# Patient Record
Sex: FEMALE | Race: BLACK OR AFRICAN AMERICAN | Employment: OTHER | URBAN - METROPOLITAN AREA
[De-identification: names, ages, dates, MRNs, and addresses within clinical notes are randomized per-mention and may not be internally consistent; named-entity substitution may affect disease eponyms.]

---

## 2017-11-19 ENCOUNTER — HOSPITAL ENCOUNTER (OUTPATIENT)
Age: 77
Setting detail: OBSERVATION
Discharge: NURSING, REHAB OR LONG-TERM CARE | End: 2017-11-22
Attending: EMERGENCY MEDICINE | Admitting: INTERNAL MEDICINE
Payer: MEDICARE

## 2017-11-19 ENCOUNTER — APPOINTMENT (OUTPATIENT)
Dept: CT IMAGING | Age: 77
End: 2017-11-19
Attending: EMERGENCY MEDICINE
Payer: MEDICARE

## 2017-11-19 ENCOUNTER — APPOINTMENT (OUTPATIENT)
Dept: GENERAL RADIOLOGY | Age: 77
End: 2017-11-19
Attending: EMERGENCY MEDICINE
Payer: MEDICARE

## 2017-11-19 DIAGNOSIS — T79.6XXA TRAUMATIC RHABDOMYOLYSIS, INITIAL ENCOUNTER (HCC): ICD-10-CM

## 2017-11-19 DIAGNOSIS — R53.1 WEAKNESS: Primary | ICD-10-CM

## 2017-11-19 DIAGNOSIS — G40.109 PARTIAL SYMPTOMATIC EPILEPSY WITH SIMPLE PARTIAL SEIZURES, NOT INTRACTABLE, WITHOUT STATUS EPILEPTICUS (HCC): ICD-10-CM

## 2017-11-19 DIAGNOSIS — R29.898 WEAKNESS OF BOTH LEGS: ICD-10-CM

## 2017-11-19 PROBLEM — R56.9 SEIZURE (HCC): Status: ACTIVE | Noted: 2017-11-19

## 2017-11-19 LAB
ALBUMIN SERPL-MCNC: 3.9 G/DL (ref 3.5–5)
ALBUMIN/GLOB SERPL: 0.8 {RATIO} (ref 1.1–2.2)
ALP SERPL-CCNC: 83 U/L (ref 45–117)
ALT SERPL-CCNC: 31 U/L (ref 12–78)
ANION GAP SERPL CALC-SCNC: 11 MMOL/L (ref 5–15)
APPEARANCE UR: CLEAR
AST SERPL-CCNC: 57 U/L (ref 15–37)
BACTERIA URNS QL MICRO: NEGATIVE /HPF
BASOPHILS # BLD: 0 K/UL (ref 0–0.1)
BASOPHILS NFR BLD: 0 % (ref 0–1)
BILIRUB SERPL-MCNC: 0.4 MG/DL (ref 0.2–1)
BILIRUB UR QL: NEGATIVE
BUN SERPL-MCNC: 13 MG/DL (ref 6–20)
BUN/CREAT SERPL: 18 (ref 12–20)
CALCIUM SERPL-MCNC: 10 MG/DL (ref 8.5–10.1)
CHLORIDE SERPL-SCNC: 101 MMOL/L (ref 97–108)
CK MB CFR SERPL CALC: 2.1 % (ref 0–2.5)
CK MB SERPL-MCNC: 33.3 NG/ML (ref 5–25)
CK SERPL-CCNC: 1597 U/L (ref 26–192)
CO2 SERPL-SCNC: 24 MMOL/L (ref 21–32)
COLOR UR: ABNORMAL
CREAT SERPL-MCNC: 0.71 MG/DL (ref 0.55–1.02)
EOSINOPHIL # BLD: 0 K/UL (ref 0–0.4)
EOSINOPHIL NFR BLD: 0 % (ref 0–7)
EPITH CASTS URNS QL MICRO: ABNORMAL /LPF
ERYTHROCYTE [DISTWIDTH] IN BLOOD BY AUTOMATED COUNT: 13.1 % (ref 11.5–14.5)
GLOBULIN SER CALC-MCNC: 4.8 G/DL (ref 2–4)
GLUCOSE SERPL-MCNC: 103 MG/DL (ref 65–100)
GLUCOSE UR STRIP.AUTO-MCNC: 100 MG/DL
HCT VFR BLD AUTO: 39.5 % (ref 35–47)
HGB BLD-MCNC: 13.6 G/DL (ref 11.5–16)
HGB UR QL STRIP: ABNORMAL
HYALINE CASTS URNS QL MICRO: ABNORMAL /LPF (ref 0–5)
KETONES UR QL STRIP.AUTO: 15 MG/DL
LEUKOCYTE ESTERASE UR QL STRIP.AUTO: NEGATIVE
LYMPHOCYTES # BLD: 2.4 K/UL (ref 0.8–3.5)
LYMPHOCYTES NFR BLD: 26 % (ref 12–49)
MCH RBC QN AUTO: 33 PG (ref 26–34)
MCHC RBC AUTO-ENTMCNC: 34.4 G/DL (ref 30–36.5)
MCV RBC AUTO: 95.9 FL (ref 80–99)
MONOCYTES # BLD: 1 K/UL (ref 0–1)
MONOCYTES NFR BLD: 11 % (ref 5–13)
NEUTS SEG # BLD: 5.9 K/UL (ref 1.8–8)
NEUTS SEG NFR BLD: 63 % (ref 32–75)
NITRITE UR QL STRIP.AUTO: NEGATIVE
PH UR STRIP: 7 [PH] (ref 5–8)
PLATELET # BLD AUTO: 182 K/UL (ref 150–400)
POTASSIUM SERPL-SCNC: 3.8 MMOL/L (ref 3.5–5.1)
PROT SERPL-MCNC: 8.7 G/DL (ref 6.4–8.2)
PROT UR STRIP-MCNC: NEGATIVE MG/DL
RBC # BLD AUTO: 4.12 M/UL (ref 3.8–5.2)
RBC #/AREA URNS HPF: ABNORMAL /HPF (ref 0–5)
SODIUM SERPL-SCNC: 136 MMOL/L (ref 136–145)
SP GR UR REFRACTOMETRY: 1.01 (ref 1–1.03)
UR CULT HOLD, URHOLD: NORMAL
UROBILINOGEN UR QL STRIP.AUTO: 0.2 EU/DL (ref 0.2–1)
WBC # BLD AUTO: 9.4 K/UL (ref 3.6–11)
WBC URNS QL MICRO: ABNORMAL /HPF (ref 0–4)

## 2017-11-19 PROCEDURE — 82550 ASSAY OF CK (CPK): CPT | Performed by: EMERGENCY MEDICINE

## 2017-11-19 PROCEDURE — 70450 CT HEAD/BRAIN W/O DYE: CPT

## 2017-11-19 PROCEDURE — 73502 X-RAY EXAM HIP UNI 2-3 VIEWS: CPT

## 2017-11-19 PROCEDURE — 65390000012 HC CONDITION CODE 44 OBSERVATION

## 2017-11-19 PROCEDURE — 82553 CREATINE MB FRACTION: CPT | Performed by: EMERGENCY MEDICINE

## 2017-11-19 PROCEDURE — 80053 COMPREHEN METABOLIC PANEL: CPT | Performed by: EMERGENCY MEDICINE

## 2017-11-19 PROCEDURE — 93005 ELECTROCARDIOGRAM TRACING: CPT

## 2017-11-19 PROCEDURE — 74011250636 HC RX REV CODE- 250/636: Performed by: INTERNAL MEDICINE

## 2017-11-19 PROCEDURE — 99218 HC RM OBSERVATION: CPT

## 2017-11-19 PROCEDURE — 73562 X-RAY EXAM OF KNEE 3: CPT

## 2017-11-19 PROCEDURE — 96372 THER/PROPH/DIAG INJ SC/IM: CPT

## 2017-11-19 PROCEDURE — 36415 COLL VENOUS BLD VENIPUNCTURE: CPT | Performed by: EMERGENCY MEDICINE

## 2017-11-19 PROCEDURE — 71010 XR CHEST PORT: CPT

## 2017-11-19 PROCEDURE — 99285 EMERGENCY DEPT VISIT HI MDM: CPT

## 2017-11-19 PROCEDURE — 84484 ASSAY OF TROPONIN QUANT: CPT | Performed by: HOSPITALIST

## 2017-11-19 PROCEDURE — 85025 COMPLETE CBC W/AUTO DIFF WBC: CPT | Performed by: EMERGENCY MEDICINE

## 2017-11-19 PROCEDURE — 81001 URINALYSIS AUTO W/SCOPE: CPT | Performed by: EMERGENCY MEDICINE

## 2017-11-19 PROCEDURE — 80164 ASSAY DIPROPYLACETIC ACD TOT: CPT | Performed by: PSYCHIATRY & NEUROLOGY

## 2017-11-19 PROCEDURE — 36415 COLL VENOUS BLD VENIPUNCTURE: CPT | Performed by: PSYCHIATRY & NEUROLOGY

## 2017-11-19 PROCEDURE — 74011250637 HC RX REV CODE- 250/637: Performed by: INTERNAL MEDICINE

## 2017-11-19 RX ORDER — SIMVASTATIN 10 MG/1
10 TABLET, FILM COATED ORAL
Status: DISCONTINUED | OUTPATIENT
Start: 2017-11-19 | End: 2017-11-22 | Stop reason: HOSPADM

## 2017-11-19 RX ORDER — LEVETIRACETAM 500 MG/1
1000 TABLET ORAL 2 TIMES DAILY
Status: DISCONTINUED | OUTPATIENT
Start: 2017-11-20 | End: 2017-11-22 | Stop reason: HOSPADM

## 2017-11-19 RX ORDER — SODIUM CHLORIDE 0.9 % (FLUSH) 0.9 %
5-10 SYRINGE (ML) INJECTION EVERY 8 HOURS
Status: DISCONTINUED | OUTPATIENT
Start: 2017-11-19 | End: 2017-11-22 | Stop reason: HOSPADM

## 2017-11-19 RX ORDER — ASPIRIN 81 MG/1
81 TABLET ORAL DAILY
Status: DISCONTINUED | OUTPATIENT
Start: 2017-11-20 | End: 2017-11-19

## 2017-11-19 RX ORDER — SODIUM CHLORIDE 0.9 % (FLUSH) 0.9 %
5-10 SYRINGE (ML) INJECTION AS NEEDED
Status: DISCONTINUED | OUTPATIENT
Start: 2017-11-19 | End: 2017-11-22 | Stop reason: HOSPADM

## 2017-11-19 RX ORDER — SODIUM CHLORIDE 9 MG/ML
75 INJECTION, SOLUTION INTRAVENOUS CONTINUOUS
Status: DISCONTINUED | OUTPATIENT
Start: 2017-11-20 | End: 2017-11-21

## 2017-11-19 RX ORDER — LEVETIRACETAM 1000 MG/1
1000 TABLET ORAL 2 TIMES DAILY
COMMUNITY

## 2017-11-19 RX ORDER — FUROSEMIDE 20 MG/1
20 TABLET ORAL DAILY
Status: DISCONTINUED | OUTPATIENT
Start: 2017-11-20 | End: 2017-11-19

## 2017-11-19 RX ORDER — SODIUM CHLORIDE 9 MG/ML
75 INJECTION, SOLUTION INTRAVENOUS CONTINUOUS
Status: DISCONTINUED | OUTPATIENT
Start: 2017-11-19 | End: 2017-11-19

## 2017-11-19 RX ORDER — CALCIUM CARBONATE 500(1250)
1 TABLET ORAL
COMMUNITY
End: 2017-12-13

## 2017-11-19 RX ORDER — SIMVASTATIN 10 MG/1
10 TABLET, FILM COATED ORAL
COMMUNITY

## 2017-11-19 RX ORDER — ENOXAPARIN SODIUM 100 MG/ML
40 INJECTION SUBCUTANEOUS EVERY 24 HOURS
Status: DISCONTINUED | OUTPATIENT
Start: 2017-11-19 | End: 2017-11-22 | Stop reason: HOSPADM

## 2017-11-19 RX ORDER — LORATADINE 10 MG/1
10 TABLET ORAL DAILY
Status: DISCONTINUED | OUTPATIENT
Start: 2017-11-20 | End: 2017-11-19

## 2017-11-19 RX ORDER — DIVALPROEX SODIUM 250 MG/1
500 TABLET, EXTENDED RELEASE ORAL EVERY 12 HOURS
COMMUNITY

## 2017-11-19 RX ORDER — METOPROLOL TARTRATE 25 MG/1
25 TABLET, FILM COATED ORAL 2 TIMES DAILY
Status: DISCONTINUED | OUTPATIENT
Start: 2017-11-19 | End: 2017-11-19

## 2017-11-19 RX ORDER — CARBIDOPA AND LEVODOPA 25; 100 MG/1; MG/1
1 TABLET, ORALLY DISINTEGRATING ORAL 3 TIMES DAILY
Status: DISCONTINUED | OUTPATIENT
Start: 2017-11-20 | End: 2017-11-22 | Stop reason: HOSPADM

## 2017-11-19 RX ORDER — LEVETIRACETAM 500 MG/1
1000 TABLET ORAL 2 TIMES DAILY
Status: DISCONTINUED | OUTPATIENT
Start: 2017-11-19 | End: 2017-11-19

## 2017-11-19 RX ORDER — CARBIDOPA AND LEVODOPA 25; 100 MG/1; MG/1
1 TABLET ORAL DAILY
Status: ON HOLD | COMMUNITY
End: 2017-11-22

## 2017-11-19 RX ORDER — CARBIDOPA AND LEVODOPA 25; 100 MG/1; MG/1
1 TABLET ORAL DAILY
Status: DISCONTINUED | OUTPATIENT
Start: 2017-11-20 | End: 2017-11-19

## 2017-11-19 RX ORDER — METOPROLOL TARTRATE 25 MG/1
25 TABLET, FILM COATED ORAL 2 TIMES DAILY
Status: DISCONTINUED | OUTPATIENT
Start: 2017-11-20 | End: 2017-11-21

## 2017-11-19 RX ORDER — FUROSEMIDE 20 MG/1
20 TABLET ORAL
COMMUNITY
End: 2017-12-13

## 2017-11-19 RX ORDER — SIMVASTATIN 10 MG/1
10 TABLET, FILM COATED ORAL
Status: DISCONTINUED | OUTPATIENT
Start: 2017-11-19 | End: 2017-11-19

## 2017-11-19 RX ORDER — LORATADINE 10 MG/1
10 TABLET ORAL DAILY
Status: DISCONTINUED | OUTPATIENT
Start: 2017-11-20 | End: 2017-11-22 | Stop reason: HOSPADM

## 2017-11-19 RX ORDER — DIVALPROEX SODIUM 500 MG/1
500 TABLET, EXTENDED RELEASE ORAL EVERY 12 HOURS
Status: DISCONTINUED | OUTPATIENT
Start: 2017-11-19 | End: 2017-11-22 | Stop reason: HOSPADM

## 2017-11-19 RX ORDER — ENOXAPARIN SODIUM 100 MG/ML
40 INJECTION SUBCUTANEOUS EVERY 24 HOURS
Status: DISCONTINUED | OUTPATIENT
Start: 2017-11-19 | End: 2017-11-19

## 2017-11-19 RX ORDER — CALCIUM CARBONATE 500(1250)
500 TABLET ORAL
Status: DISCONTINUED | OUTPATIENT
Start: 2017-11-20 | End: 2017-11-22 | Stop reason: HOSPADM

## 2017-11-19 RX ORDER — FUROSEMIDE 40 MG/1
20 TABLET ORAL DAILY
Status: DISCONTINUED | OUTPATIENT
Start: 2017-11-20 | End: 2017-11-19

## 2017-11-19 RX ORDER — DIVALPROEX SODIUM 500 MG/1
500 TABLET, EXTENDED RELEASE ORAL EVERY 12 HOURS
Status: DISCONTINUED | OUTPATIENT
Start: 2017-11-19 | End: 2017-11-19

## 2017-11-19 RX ORDER — CALCIUM CARBONATE 500(1250)
500 TABLET ORAL
Status: DISCONTINUED | OUTPATIENT
Start: 2017-11-20 | End: 2017-11-19

## 2017-11-19 RX ORDER — METOPROLOL TARTRATE 25 MG/1
25 TABLET, FILM COATED ORAL 2 TIMES DAILY
COMMUNITY

## 2017-11-19 RX ADMIN — SODIUM CHLORIDE 500 ML: 900 INJECTION, SOLUTION INTRAVENOUS at 23:07

## 2017-11-19 RX ADMIN — Medication 10 ML: at 21:35

## 2017-11-19 RX ADMIN — DIVALPROEX SODIUM 500 MG: 500 TABLET, FILM COATED, EXTENDED RELEASE ORAL at 23:27

## 2017-11-19 RX ADMIN — SIMVASTATIN 10 MG: 10 TABLET, FILM COATED ORAL at 23:27

## 2017-11-19 RX ADMIN — ENOXAPARIN SODIUM 40 MG: 40 INJECTION SUBCUTANEOUS at 21:34

## 2017-11-19 NOTE — IP AVS SNAPSHOT
Summary of Care Report The Summary of Care report has been created to help improve care coordination. Users with access to [employer] or [address] (Web-based application) may access additional patient information including the Discharge Summary. If you are not currently a [address] user and need more information, please call the number listed below in the [place] [address] section and ask to be connected with Medical Records. Facility Information Name Address Phone [address] [facility] [phone] Patient Information Patient Name Sex  Rebecca Martinez ([id number]) Female 1940 Discharge Information Admitting Provider Service Area Unit  
 Juan Daniel Shook MD / [address] [place] [address] Med Surg / [phone] Discharge Provider Discharge Date/Time Discharge Disposition Destination (none) 2017 (Pending) MANUEL (none) Patient Language Language ENGLISH [13] Hospital Problems as of 2017  Reviewed: 2016  1:18 PM by Anthony Yang MD  
  
  
  
 Class Noted - Resolved Last Modified POA Active Problems Weakness of both legs  2017 - Present 2017 by Camila Olszewski, DO Unknown Entered by Jordon Domingo MD  
  Partial symptomatic epilepsy ([facility] [address]  2017 - Present 2017 by Camila Olszewski, DO Unknown Entered by Camila Olszewski, DO Weakness due to cerebrovascular accident ([address]  2017 - Present 2017 by Cecilia Flores MD Unknown Entered by Cecilia Flores MD  
  
Non-Hospital Problems as of 2017  Reviewed: 2016  1:18 PM by Anthony Yang MD  
  
  
  
 Class Noted - Resolved Last Modified Active Problems   Seizures ([address]  2016 - Present 2016 by Danae No MD  
  Entered by Danae No MD  
  
 You are allergic to the following Allergen Reactions Latex Hives Amoxicillin Hives Penicillins Other (comments) \"It turns my mouth inside out with sores\" Prednisone Other (comments) Sulfa (Sulfonamide Antibiotics) Other (comments) \"It made me real sick to my stomach\" Current Discharge Medication List  
  
START taking these medications Dose & Instructions Dispensing Information Comments  
 levoFLOXacin 250 mg tablet Commonly known as:  Cassy Born Dose:  250 mg Take 1 Tab by mouth every twenty-four (24) hours. Quantity:  7 Tab Refills:  0  
   
 lidocaine 5 % topical cream  
 Apply  to affected area two (2) times daily as needed for Pain (Left ankle & foot). Quantity:  1 Tube Refills:  0 phenazopyridine 100 mg tablet Commonly known as:  PYRIDIUM Dose:  100 mg Take 1 Tab by mouth three (3) times daily (after meals) for 3 days. Dysuria / hematuria Quantity:  9 Tab Refills:  0 CONTINUE these medications which have CHANGED Dose & Instructions Dispensing Information Comments  
 carbidopa-levodopa  mg per tablet Commonly known as:  SINEMET What changed:  when to take this Dose:  1 Tab Take 1 Tab by mouth three (3) times daily. Quantity:  90 Tab Refills:  0 CONTINUE these medications which have NOT CHANGED Dose & Instructions Dispensing Information Comments  
 aspirin delayed-release 81 mg tablet Dose:  81 mg Take 81 mg by mouth daily. Refills:  0  
   
 calcium carbonate 500 mg calcium (1,250 mg) tablet Commonly known as:  OS-MOIZ Dose:  1 Tab Take 1 Tab by mouth Daily (before breakfast). Refills:  0  
   
 divalproex  mg ER tablet Commonly known as:  DEPAKOTE ER Dose:  500 mg Take 500 mg by mouth every twelve (12) hours. Refills:  0  
   
 furosemide 20 mg tablet Commonly known as:  LASIX Dose:  20 mg Take 20 mg by mouth daily as needed. Refills:  0  
   
 levETIRAcetam 1,000 mg tablet Dose:  1000 mg Take 1,000 mg by mouth two (2) times a day. Refills:  0  
   
 loratadine 10 mg tablet Commonly known as:  Flores [place] Dose:  10 mg Take 10 mg by mouth daily. Refills:  0  
   
 metoprolol tartrate 25 mg tablet Commonly known as:  LOPRESSOR Dose:  25 mg Take 25 mg by mouth two (2) times a day. Refills:  0  
   
 multivitamin tablet Commonly known as:  ONE A DAY Dose:  1 Tab Take 1 Tab by mouth daily. Refills:  0  
   
 simvastatin 10 mg tablet Commonly known as:  ZOCOR Dose:  10 mg Take 10 mg by mouth nightly. Refills:  0 Follow-up Information Follow up With Details Comments Contact Info Lamont Laird MD   Patient can only remember the practice name and not the physician Discharge Instructions None Chart Review Routing History Recipient Method Report Sent By Julia Odell MD  
Fax: [phone] Phone: [phone] Fax Eliza Gilbert MD [employer]OUT[employer] REPORT Candido Jessica MD [[id number]] 5/10/2016 12:08 AM 05/10/2016 Patricia Odell MD  
Fax: [phone] Phone: [phone] Fax Eliza Gilbert MD NOTES AUT[employer] ROUTING REPORT Candido Jessica MD [[id number]] 5/10/2016 12:24 AM 05/10/2016 Candido Jessica MD  
Phone: [phone] In [employer] Routed [employer] [[id number]] 5/10/2016 12:35 PM 05/10/2016 Leveda Koyanagi, MD  
Fax: [phone] Phone: [phone] Fax IP Auto [employer] [[id number]] 5/10/2016 12:35 PM 05/10/2016 Patricia Odell MD  
Fax: [phone] Phone: [phone] Fax Eliza Gilbert MD NOTES AUTO ROUTING REPORT Juana Gallo [[id number]] 5/11/2016 11:46 AM 05/11/2016 Patricia Odell MD  
Fax: [phone] Phone: [phone] Fax [facility] IP MD NOTES AUTO ROUTING REPORT Rhea Montalvo MD [[id number]] 5/12/2016  8:59 AM 05/12/2016 Phil Rinaldi MD  
Fax: [phone] Phone: [phone] Fax MIGUEL ANGELLAUREN CALDERON MD NOTES AUT[employer] ROUTING REPORT MD Daniela [[id number]] 5/12/2016  9:54 AM 05/12/2016 Lui Correia MD  
Fax: [phone] Phone: [phone] Fax Nany Walter MD NOTES [employer] ROUTING REPORT Annamaria Barron MD [[id number]] 11/20/2017 10:09 PM 11/20/2017

## 2017-11-19 NOTE — ED TRIAGE NOTES
Rescue reports pt was found on floor of her residence of [facility] this afternoon by sercurity guard. Pt reports she slid out of the bed last night while getting ready for bed. Pt was on floor for approximately 14-16 hrs. Pt has pain to left hip, leg and middle pain. Son also reports he believes patient had a seizure; pt has history of seizures last seizure about a year ago per son.

## 2017-11-19 NOTE — Clinical Note
Status[de-identified] Inpatient [101] Type of Bed: Telemetry [19] Inpatient Hospitalization Certified Necessary for the Following Reasons: 9. Other (further clarification in H&P documentation) Admitting Diagnosis: Seizure ([facility] [address] [243331] Admitting Physician: Ryan Perry [[id number]] Attending Physician: Ryan Perry [[id number]] Estimated Length of Stay: 2 Midnights Discharge Plan[de-identified] Home with Office Follow-up

## 2017-11-19 NOTE — ED NOTES
No change to previous assessment. Patient remains alert, drowsy, denies needs. No seizure activity since arrival.  Son remains at bedside, call bell in reach.

## 2017-11-19 NOTE — IP AVS SNAPSHOT
[address] 
[phone] Patient: Jaziel Valencia MRN: [id number] EQP:7[birth year] My Medications TAKE these medications as instructed Instructions Each Dose to Equal  
 Morning Noon Evening Bedtime  
 aspirin delayed-release 81 mg tablet Your last dose was: Your next dose is: Take 81 mg by mouth daily. 81 mg  
    
   
   
   
  
 calcium carbonate 500 mg calcium (1,250 mg) tablet Commonly known as:  OS-MOIZ Your last dose was: Your next dose is: Take 1 Tab by mouth Daily (before breakfast). 1 Tab  
    
   
   
   
  
 carbidopa-levodopa  mg per tablet Commonly known as:  SINEMET Your last dose was: Your next dose is: Take 1 Tab by mouth three (3) times daily. 1 Tab  
    
   
   
   
  
 divalproex  mg ER tablet Commonly known as:  DEPAKOTE ER Your last dose was: Your next dose is: Take 500 mg by mouth every twelve (12) hours. 500 mg  
    
   
   
   
  
 furosemide 20 mg tablet Commonly known as:  LASIX Your last dose was: Your next dose is: Take 20 mg by mouth daily as needed. 20 mg  
    
   
   
   
  
 levETIRAcetam 1,000 mg tablet Your last dose was: Your next dose is: Take 1,000 mg by mouth two (2) times a day. 1000 mg  
    
   
   
   
  
 levoFLOXacin 250 mg tablet Commonly known as:  Dmitri Yates Your last dose was: Your next dose is: Take 1 Tab by mouth every twenty-four (24) hours. 250 mg  
    
   
   
   
  
 lidocaine 5 % topical cream  
   
Your last dose was: Your next dose is:    
   
   
 Apply  to affected area two (2) times daily as needed for Pain (Left ankle & foot). loratadine 10 mg tablet Commonly known as:  Yaquelin Kathleen  
   
 Your last dose was: Your next dose is: Take 10 mg by mouth daily. 10 mg  
    
   
   
   
  
 metoprolol tartrate 25 mg tablet Commonly known as:  LOPRESSOR Your last dose was: Your next dose is: Take 25 mg by mouth two (2) times a day. 25 mg  
    
   
   
   
  
 multivitamin tablet Commonly known as:  ONE A DAY Your last dose was: Your next dose is: Take 1 Tab by mouth daily. 1 Tab  
    
   
   
   
  
 phenazopyridine 100 mg tablet Commonly known as:  PYRIDIUM Your last dose was: Your next dose is: Take 1 Tab by mouth three (3) times daily (after meals) for 3 days. Dysuria / hematuria  
 100 mg  
    
   
   
   
  
 simvastatin 10 mg tablet Commonly known as:  ZOCOR Your last dose was: Your next dose is: Take 10 mg by mouth nightly. 10 mg Where to Get Your Medications Information on where to get these meds will be given to you by the nurse or doctor. ! Ask your nurse or doctor about these medications  
  carbidopa-levodopa  mg per tablet  
 levoFLOXacin 250 mg tablet  
 lidocaine 5 % topical cream  
 phenazopyridine 100 mg tablet

## 2017-11-19 NOTE — ED PROVIDER NOTES
HPI Comments: 76 y.o. female with past medical history significant for stroke, MI, high cholesterol, anemia, parkinson's, partial seizures, and craniotomy who presents from home via EMS with chief complaint of fall. The pt had a GLF this afternoon because her L hand went numb and it is still numb. According to the son, the pt was at her baseline one day ago and was with the family watching football games outside. Near the end of the day, the pt started to get fatigued and was dropped off at [facility]. The son had a phonecall with the pt at 2100 and it was normal. Son believes that she had an unwitnessed seizure sometime last night because the pt will look the other way when speaking. She has had a seizure once a year after brain surgery. The pt remembers the EMS ride to the hospital. The pt denies fever, vomiting, HA, chills, diaphoretic, nausea, diarrhea, and abdominal pain. There are no other acute medical concerns at this time. Social hx: nonsmoker, no EtOH use  PCP: No primary care provider on file. GI: Patrick Chappell MD    Old Chart Review: On 10/20, Patrick Chappell placed a stent    Note written by Jeffery Estrada, as dictated by Osorio Sebastian MD 4:18 PM      The history is provided by the patient. No  was used. Past Medical History:   Diagnosis Date    Anemia     High cholesterol     History of MI (myocardial infarction)     x 2    Hx of Parkinson's disease     Hx of partial seizures     Stroke [facility])        Past Surgical History:   Procedure Laterality Date    CARDIAC SURG PROCEDURE UNLIST      HX CRANIOTOMY           No family history on file. Social History     Social History    Marital status: UNKNOWN     Spouse name: N/A    Number of children: N/A    Years of education: N/A     Occupational History    Not on file.      Social History Main Topics    Smoking status: Never Smoker    Smokeless tobacco: Never Used    Alcohol use No    Drug use: No    Sexual activity: Not on file     Other Topics Concern    Not on file     Social History Narrative         ALLERGIES: Penicillins; Prednisone; and Sulfa (sulfonamide antibiotics)    Review of Systems   Constitutional: Negative for activity change, appetite change, chills, diaphoresis, fatigue and fever. HENT: Negative for ear pain, facial swelling, sore throat and trouble swallowing. Eyes: Negative for pain, discharge and visual disturbance. Respiratory: Negative for chest tightness, shortness of breath and wheezing. Cardiovascular: Negative for chest pain and palpitations. Gastrointestinal: Negative for abdominal pain, blood in stool, diarrhea, nausea and vomiting. Genitourinary: Negative for difficulty urinating, flank pain and hematuria. Musculoskeletal: Negative for arthralgias, joint swelling, myalgias and neck pain. Skin: Negative for color change and rash. Neurological: Positive for numbness. Negative for dizziness, weakness and headaches. Hematological: Negative for adenopathy. Does not bruise/bleed easily. Psychiatric/Behavioral: Negative for behavioral problems, confusion and sleep disturbance. All other systems reviewed and are negative. There were no vitals filed for this visit. Physical Exam   Constitutional: She is oriented to person, place, and time. She appears well-developed and well-nourished. No distress. HENT:   Head: Normocephalic and atraumatic. Nose: Nose normal.   Mouth/Throat: Oropharynx is clear and moist.   Eyes: Conjunctivae and EOM are normal. Pupils are equal, round, and reactive to light. No scleral icterus. Neck: Normal range of motion. Neck supple. No JVD present. No tracheal deviation present. No thyromegaly present. No carotid bruits noted. Cardiovascular: Normal rate, regular rhythm, normal heart sounds and intact distal pulses. Exam reveals no gallop and no friction rub. No murmur heard.   Pulmonary/Chest: Effort normal and breath sounds normal. No respiratory distress. She has no wheezes. She has no rales. She exhibits no tenderness. Abdominal: Soft. Bowel sounds are normal. She exhibits no distension and no mass. There is no tenderness. There is no rebound and no guarding. Genitourinary:   Genitourinary Comments: Strong smell of urine in diaper   Musculoskeletal: Normal range of motion. She exhibits no edema or tenderness (no hip tenderness). Lymphadenopathy:     She has no cervical adenopathy. Neurological: She is alert and oriented to person, place, and time. She has normal reflexes. No cranial nerve deficit. Coordination normal.   Follows commands other than when asked to lift the LLE, she lifted to LLE which the son at bedside said was typical after her seizures. Skin: Skin is warm and dry. No rash noted. No erythema. Psychiatric: She has a normal mood and affect. Her behavior is normal. Judgment and thought content normal.   Nursing note and vitals reviewed. Note written by Jeffery Pierson, as dictated by Evangelina Castillo MD 5:30 PM      MDM  Number of Diagnoses or Management Options     Amount and/or Complexity of Data Reviewed  Clinical lab tests: ordered and reviewed  Decide to obtain previous medical records or to obtain history from someone other than the patient: yes  Obtain history from someone other than the patient: yes  Review and summarize past medical records: yes  Discuss the patient with other providers: yes  Independent visualization of images, tracings, or specimens: yes    Risk of Complications, Morbidity, and/or Mortality  Presenting problems: high  Diagnostic procedures: high  Management options: high    Patient Progress  Patient progress: stable    ED Course       Procedures  ED EKG interpretation:  Rhythm: sinus tachycardia; and regular . Rate (approx.): 105;  Axis: normal; ST/T wave: non-specific changes; LBBB  Note written by Jeffery Pierson, as dictated by Shen Turner Reagan Rachel MD 4:37 PM    5:46 PM  Awaiting CT head and lab results      6:15 PM  CONSULT NOTE:  6:15 PM Ольга Portillo MD spoke with Dr. August Patrick, Consult for Hospitalist.  Discussed available diagnostic tests and clinical findings. He is in agreement with care plans as outlined. Labs are fine. It is unknown why she is weak. She was unable to get off the floor for 16 hours Hospitalist will admit. 7:40 PM  Spoke with [address] [place] [address]. If he can confirm that she is a [facility] pt, he will put in orders. If he cannot confirm, he will call back.

## 2017-11-19 NOTE — IP AVS SNAPSHOT
[address] [address] 
[phone] Patient: Harshad Contreras MRN: [id number] WVF:1/47/[id number] About your hospitalization You were admitted on:  November 19, 2017 You last received care in the:  [facility] 2N MED SURG You were discharged on:  November 22, 2017 Why you were hospitalized Your primary diagnosis was:  Not on File Your diagnoses also included:  Weakness Of Both Legs, Partial Symptomatic Epilepsy (Hcc), Weakness Due To Cerebrovascular Accident (Hcc) Things You Need To Do (next 8 weeks) Follow up with Lamont Laird MD  
  
Where:  Patient can only remember the practice name and not the physician Discharge Orders None A check iesha indicates which time of day the medication should be taken. My Medications TAKE these medications as instructed Instructions Each Dose to Equal  
 Morning Noon Evening Bedtime  
 aspirin delayed-release 81 mg tablet Your last dose was: Your next dose is: Take 81 mg by mouth daily. 81 mg  
    
   
   
   
  
 calcium carbonate 500 mg calcium (1,250 mg) tablet Commonly known as:  OS-MOIZ Your last dose was: Your next dose is: Take 1 Tab by mouth Daily (before breakfast). 1 Tab  
    
   
   
   
  
 carbidopa-levodopa  mg per tablet Commonly known as:  SINEMET Your last dose was: Your next dose is: Take 1 Tab by mouth three (3) times daily. 1 Tab  
    
   
   
   
  
 divalproex  mg ER tablet Commonly known as:  DEPAKOTE ER Your last dose was: Your next dose is: Take 500 mg by mouth every twelve (12) hours. 500 mg  
    
   
   
   
  
 furosemide 20 mg tablet Commonly known as:  LASIX Your last dose was: Your next dose is: Take 20 mg by mouth daily as needed.   
 20 mg  
    
   
   
   
  
 levETIRAcetam 1,000 mg tablet Your last dose was: Your next dose is: Take 1,000 mg by mouth two (2) times a day. 1000 mg  
    
   
   
   
  
 levoFLOXacin 250 mg tablet Commonly known as:  Heidi Eddy Your last dose was: Your next dose is: Take 1 Tab by mouth every twenty-four (24) hours. 250 mg  
    
   
   
   
  
 lidocaine 5 % topical cream  
   
Your last dose was: Your next dose is:    
   
   
 Apply  to affected area two (2) times daily as needed for Pain (Left ankle & foot). loratadine 10 mg tablet Commonly known as:  Jearline Juli Your last dose was: Your next dose is: Take 10 mg by mouth daily. 10 mg  
    
   
   
   
  
 metoprolol tartrate 25 mg tablet Commonly known as:  LOPRESSOR Your last dose was: Your next dose is: Take 25 mg by mouth two (2) times a day. 25 mg  
    
   
   
   
  
 multivitamin tablet Commonly known as:  ONE A DAY Your last dose was: Your next dose is: Take 1 Tab by mouth daily. 1 Tab  
    
   
   
   
  
 phenazopyridine 100 mg tablet Commonly known as:  PYRIDIUM Your last dose was: Your next dose is: Take 1 Tab by mouth three (3) times daily (after meals) for 3 days. Dysuria / hematuria  
 100 mg  
    
   
   
   
  
 simvastatin 10 mg tablet Commonly known as:  ZOCOR Your last dose was: Your next dose is: Take 10 mg by mouth nightly. 10 mg Where to Get Your Medications Information on where to get these meds will be given to you by the nurse or doctor. ! Ask your nurse or doctor about these medications  
  carbidopa-levodopa  mg per tablet  
 levoFLOXacin 250 mg tablet  
 lidocaine 5 % topical cream  
 phenazopyridine 100 mg tablet Discharge Instructions None Introducing [facility]! Mauricio Davila introduces [facility] patient portal. Now you can access parts of your medical record, email your doctor's office, and request medication refills online. 1. In your internet browser, go to [email or web address] 2. Click on the First Time User? Click Here link in the Sign In box. You will see the New Member Sign Up page. 3. Enter your [facility] Access Code exactly as it appears below. You will not need to use this code after youve completed the sign-up process. If you do not sign up before the expiration date, you must request a new code. · [facility] Access Code: [email or web address] Expires: 2/20/2018  1:49 PM 
 
4. Enter the last four digits of your Social Security Number (xxxx) and Date of Birth (mm/dd/yyyy) as indicated and click Submit. You will be taken to the next sign-up page. 5. Create a [facility] ID. This will be your [facility] login ID and cannot be changed, so think of one that is secure and easy to remember. 6. Create a [facility] password. You can change your password at any time. 7. Enter your Password Reset Question and Answer. This can be used at a later time if you forget your password. 8. Enter your e-mail address. You will receive e-mail notification when new information is available in [address] 9. Click Sign Up. You can now view and download portions of your medical record. 10. Click the Download Summary menu link to download a portable copy of your medical information. If you have questions, please visit the Frequently Asked Questions section of the [facility] website. Remember, [facility] is NOT to be used for urgent needs. For medical emergencies, dial 911. Now available from your iPhone and Android! Unresulted Labs-Please follow up with your PCP about these lab tests Order Current Status CULTURE, URINE In process Providers Seen During Your Hospitalization Provider Specialty Primary office phone Thomas Camilo MD Emergency Medicine [phone] Arvind Miller MD Internal Medicine [phone] Cindy Varma MD Internal Medicine [phone] Jazzy Rothman MD Internal Medicine [phone] Your Primary Care Physician (PCP) Primary Care Physician Office Phone Office Fax OTHER, PHYS ** None ** ** None ** You are allergic to the following Allergen Reactions Latex Hives Amoxicillin Hives Penicillins Other (comments) \"It turns my mouth inside out with sores\" Prednisone Other (comments) Sulfa (Sulfonamide Antibiotics) Other (comments) \"It made me real sick to my stomach\" Recent Documentation Height Weight Breastfeeding? BMI Smoking Status 1.626 m 76.9 kg No 29.1 kg/m2 Never Smoker Emergency Contacts Name Discharge Info Relation Home Work Mobile Branch,Tawanda DISCHARGE CAREGIVER [3] Child [2] [phone] Patient Belongings The following personal items are in your possession at time of discharge: 
  Dental Appliances: None  Visual Aid: Glasses      Home Medications: None   Jewelry: None  Clothing: None    Other Valuables: None Please provide this summary of care documentation to your next provider. Signatures-by signing, you are acknowledging that this After Visit Summary has been reviewed with you and you have received a copy. Patient Signature:  ____________________________________________________________ Date:  ____________________________________________________________  
  
Ho Bateman Provider Signature:  ____________________________________________________________ Date:  ____________________________________________________________

## 2017-11-20 ENCOUNTER — APPOINTMENT (OUTPATIENT)
Dept: MRI IMAGING | Age: 77
End: 2017-11-20
Attending: INTERNAL MEDICINE
Payer: MEDICARE

## 2017-11-20 PROBLEM — R29.898 WEAKNESS OF BOTH LEGS: Status: ACTIVE | Noted: 2017-11-19

## 2017-11-20 PROBLEM — G40.109 PARTIAL SYMPTOMATIC EPILEPSY (HCC): Status: ACTIVE | Noted: 2017-11-20

## 2017-11-20 LAB
ATRIAL RATE: 105 BPM
CALCULATED P AXIS, ECG09: 42 DEGREES
CALCULATED R AXIS, ECG10: 32 DEGREES
CALCULATED T AXIS, ECG11: -159 DEGREES
DIAGNOSIS, 93000: NORMAL
P-R INTERVAL, ECG05: 170 MS
Q-T INTERVAL, ECG07: 366 MS
QRS DURATION, ECG06: 124 MS
QTC CALCULATION (BEZET), ECG08: 483 MS
TROPONIN I SERPL-MCNC: <0.04 NG/ML
VALPROATE SERPL-MCNC: 93 UG/ML (ref 50–100)
VENTRICULAR RATE, ECG03: 105 BPM

## 2017-11-20 PROCEDURE — 74011250636 HC RX REV CODE- 250/636: Performed by: INTERNAL MEDICINE

## 2017-11-20 PROCEDURE — 97530 THERAPEUTIC ACTIVITIES: CPT | Performed by: PHYSICAL THERAPIST

## 2017-11-20 PROCEDURE — G8988 SELF CARE GOAL STATUS: HCPCS

## 2017-11-20 PROCEDURE — 96372 THER/PROPH/DIAG INJ SC/IM: CPT

## 2017-11-20 PROCEDURE — 97161 PT EVAL LOW COMPLEX 20 MIN: CPT | Performed by: PHYSICAL THERAPIST

## 2017-11-20 PROCEDURE — 74011250637 HC RX REV CODE- 250/637: Performed by: INTERNAL MEDICINE

## 2017-11-20 PROCEDURE — 97535 SELF CARE MNGMENT TRAINING: CPT

## 2017-11-20 PROCEDURE — 70551 MRI BRAIN STEM W/O DYE: CPT

## 2017-11-20 PROCEDURE — 99218 HC RM OBSERVATION: CPT

## 2017-11-20 PROCEDURE — 97530 THERAPEUTIC ACTIVITIES: CPT

## 2017-11-20 PROCEDURE — 65270000032 HC RM SEMIPRIVATE

## 2017-11-20 PROCEDURE — 95816 EEG AWAKE AND DROWSY: CPT | Performed by: PSYCHIATRY & NEUROLOGY

## 2017-11-20 PROCEDURE — 97166 OT EVAL MOD COMPLEX 45 MIN: CPT

## 2017-11-20 PROCEDURE — G8987 SELF CARE CURRENT STATUS: HCPCS

## 2017-11-20 RX ADMIN — CALCIUM 500 MG: 500 TABLET ORAL at 06:34

## 2017-11-20 RX ADMIN — DIVALPROEX SODIUM 500 MG: 500 TABLET, FILM COATED, EXTENDED RELEASE ORAL at 23:20

## 2017-11-20 RX ADMIN — METOPROLOL TARTRATE 25 MG: 25 TABLET ORAL at 09:05

## 2017-11-20 RX ADMIN — LEVETIRACETAM 1000 MG: 500 TABLET, FILM COATED ORAL at 09:05

## 2017-11-20 RX ADMIN — LORATADINE 10 MG: 10 TABLET ORAL at 09:05

## 2017-11-20 RX ADMIN — DIVALPROEX SODIUM 500 MG: 500 TABLET, FILM COATED, EXTENDED RELEASE ORAL at 09:05

## 2017-11-20 RX ADMIN — SIMVASTATIN 10 MG: 10 TABLET, FILM COATED ORAL at 23:21

## 2017-11-20 RX ADMIN — CARBIDOPA AND LEVODOPA 1 TABLET: 25; 100 TABLET, ORALLY DISINTEGRATING ORAL at 16:27

## 2017-11-20 RX ADMIN — SODIUM CHLORIDE 125 ML/HR: 900 INJECTION, SOLUTION INTRAVENOUS at 00:20

## 2017-11-20 RX ADMIN — ENOXAPARIN SODIUM 40 MG: 40 INJECTION SUBCUTANEOUS at 23:20

## 2017-11-20 RX ADMIN — LEVETIRACETAM 1000 MG: 500 TABLET, FILM COATED ORAL at 16:27

## 2017-11-20 RX ADMIN — CARBIDOPA AND LEVODOPA 1 TABLET: 25; 100 TABLET, ORALLY DISINTEGRATING ORAL at 09:05

## 2017-11-20 RX ADMIN — Medication 10 ML: at 16:28

## 2017-11-20 RX ADMIN — Medication 10 ML: at 06:34

## 2017-11-20 RX ADMIN — METOPROLOL TARTRATE 25 MG: 25 TABLET ORAL at 16:27

## 2017-11-20 NOTE — PROGRESS NOTES
Problem: Falls - Risk of  Goal: *Absence of Falls  Document Katie Fall Risk and appropriate interventions in the flowsheet.    Outcome: Progressing Towards Goal  Fall Risk Interventions:  Mobility Interventions: Assess mobility with egress test, Bed/chair exit alarm, Communicate number of staff needed for ambulation/transfer, OT consult for ADLs, Patient to call before getting OOB, PT Consult for mobility concerns, PT Consult for assist device competence, Strengthening exercises (ROM-active/passive), Utilize walker, cane, or other assitive device, Utilize gait belt for transfers/ambulation         Medication Interventions: Bed/chair exit alarm, Evaluate medications/consider consulting pharmacy, Patient to call before getting OOB, Teach patient to arise slowly, Utilize gait belt for transfers/ambulation    Elimination Interventions: Bed/chair exit alarm, Call light in reach, Patient to call for help with toileting needs, Toileting schedule/hourly rounds    History of Falls Interventions: Bed/chair exit alarm, Consult care management for discharge planning, Door open when patient unattended, Evaluate medications/consider consulting pharmacy, Investigate reason for fall, Utilize gait belt for transfer/ambulation

## 2017-11-20 NOTE — PROCEDURES
ELECTROENCEPHALOGRAM REPORT     Patient Name: Jorge Alberto Stahl  : 1940  Age: 76 y.o. Ordering physician: July Zelaya DO    Date of EE2017    Interpreting physician: Agnes Lizarraga DO      PROCEDURE: EEG. CLINICAL INDICATION: The patient is a 76 y.o. female who is being evaluated for baseline electro cerebral activities and to rule out seizure focus. DESCRIPTION OF THE RECORD:     Throughout this recording there is diffuse slowing more so on the right. No clear areas of discharges seen. Hyperventilation and photic stimulation were not performed. During the recording the patient did not achieve stage II sleep      INTERPRETATION:     This is an abnormal electroencephalogram showing diffuse slowing, right worse than left, suggestive for global and focal cortical dysfunction which is nonspecific and may be seen in underlying metabolic/infectious/inflammatory/structural processes. Clinical correlation recommended.       July Zelaya DO  Diplomate, American Board of Psychiatry & Neurology (Neurology)

## 2017-11-20 NOTE — INTERDISCIPLINARY ROUNDS
IDR/SLIDR Summary          Patient: Rebecca Almaguer MRN: [id number]    Age: 76 y.o. YOB: 1940 Room/Bed: [facility]   Admit Diagnosis: Seizure ([facility] [address]  Seizure ([facility] [address]  Principal Diagnosis: <principal problem not specified>   Goals: Safety, MRI, d/c planning  Readmission: NO  Quality Measure: Not applicable  VTE Prophylaxis: Chemical  Influenza Vaccine screening completed? YES  Pneumococcal Vaccine screening completed? YES  Mobility needs: Yes   Nutrition plan:Yes  Consults:P.T, O.T. and Case Management    Financial concerns:No  Escalated to CM? NO  RRAT Score: 10   Interventions:tbd  Testing due for pt today?  YES  LOS: 0 days Expected length of stay 1-2 days  Discharge plan: From [address] [place] [address] Avenue   PCP: Lamont Laird MD  Transportation needs: Yes    Days before discharge:two or more days before discharge   Discharge disposition: Independent Living    Signed:     Mae Odell RN  11/20/2017  1:06 AM

## 2017-11-20 NOTE — PROGRESS NOTES
11/19/17 2300   Vital Signs   Temp 98.4 °F (36.9 °C)   Temp Source Oral   Pulse (Heart Rate) (!) 110   Heart Rate Source Monitor   Cardiac Rhythm NSR;BBB   Resp Rate 20   O2 Sat (%) 98 %   Level of Consciousness Alert   BP 98/82   MAP (Calculated) 87   BP 1 Method Automatic   BP 1 Location Right arm   BP Patient Position At rest   MEWS Score 3   Alarms Set and Audible Cardiac alarms   Box Number 652   Electrodes Replaced Yes   Pain 1   Pain Scale 1 Numeric (0 - 10)   Pain Intensity 1 0   Patient Stated Pain Goal 0   Pain Reassessment 1 Yes   Oxygen Therapy   O2 Device Room air   Patient Observation   Patient Turned Turns self   Ambulate No (Comment)   Activity In bed;Resting quietly   Mode of Transportation Stretcher   MD aware.  Order for 500 ml Fluid bolus and IV fluids at 125 ml/hr

## 2017-11-20 NOTE — H&P
[address] [place] [address], [address] [place] [address]   HISTORY AND PHYSICAL       Name:  Shaunna Song   MR#:  [id number]   :  1940   Account #:  [de-identified]        Date of Adm:  2017       CHIEF COMPLAINT: History of fall last night, possible seizure x1 last   night. HISTORY OF PRESENT ILLNESS: The patient is a 78-year-old female   with a known history of seizure disorder, status post craniotomy with   surgical resection of the benign tumor. Also has a history of Parkinson   disease. As per the patient yesterday, last night, which she was trying   to get into the bed, felt extremely weak in both legs and slid on the   floor where she laid on the ground for the next 16 hours. The patient   had also noted a jerky movement of the left arm and some weakness   on the left side of the body and some numbness in the left upper   extremity. The patient when she had fall apparently also hit her knee on   the ground and twisted her left ankle which is sore at this time. Continues to have some numbness in the left lower extremity. The   weakness on the left side has improved. The son who came to help this   patient had noted the patient had a blank look on the face and while he   was speaking to her, she was looking on the other side. Did not have   any slurring of the speech. The patient denied having any double   vision. PAST MEDICAL HISTORY: As mentioned above. Has a history of   Parkinson disease, status post resection of the tumor from the frontal   lobe. History of seizure disorder, status post history of myocardial   infarction. PAST SURGICAL HISTORY: Status post stent placement. SOCIAL HISTORY: No alcohol. No drug use. Mother and father are   . ALLERGIES   1. AMOXICILLIN. 2. LATEX. 3. PENICILLIN. 4. PREDNISONE. 5. SULFA. MEDICATIONS   1. Lasix 20 mg p.o. daily. 2. Claritin 10 mg p.o. daily. 3. Sinemet 25/100 one tablet 3 times a day.    4. Lopressor 25 mg 2 times a day. 5. Valproic acid 250 mg p.o. b.i.d.   6. Aspirin 81 mg p.o. daily. 7. Zocor 10 mg p.o. daily. 8. Multivitamin every day. REVIEW OF SYSTEMS   CONSTITUTIONAL: Negative fevers. Negative chills. Negative night   sweats. HEENT: Negative sore throat, negative earaches. Negative visual   problems. Negative diplopia. PULMONARY: Negative cough, negative wheezing, negative   shortness of breath. CARDIOVASCULAR: Negative chest pain. Negative palpitations. GASTROINTESTINAL: Negative diarrhea. Negative abdominal pain. Negative vomiting. CENTRAL NERVOUS SYSTEM: Positive numbness of the left arm. Positive weakness of the left lower extremity. PHYSICAL EXAMINATION   VITAL SIGNS: Pulse rate of 101, blood pressure 136/65, saturation of   98%. HEAD, EYES, EARS, NOSE, AND THROAT: Pupils were equal,   reactive to light. Oral mucosa was moist.   NECK: Supple. LUNGS: Clear. CARDIOVASCULAR: S1, S2 audible. No S3, S4.   ABDOMEN: Soft, nontender. EXTREMITIES: Upper extremities, no edema was noted. CENTRAL NERVOUS SYSTEM: Strength 5/5 noted of bilateral upper   extremities, 4/5 in the left lower extremity. The patient pain on the left   knee and left ankle. Plantar are bilaterally downgoing. LABORATORY DATA: The patient's CBC was normal. Sodium was   within normal limits. Urine was normal.     CT scan was negative. Chest x-ray was within normal limits. ASSESSMENT AND PLAN   1. The patient is a 68-year-old female with a known history of seizure   disorder, presents with possible seizure-like activity with numbness of   the left arm, some jerky movement and weakness of the left side. The   patient's valproic acid labs will be checked. The antiepileptic medication   labs will be checked and neurology consult has been taken. Will order   the MRI and the MRA scan. Possibility of a transient ischemic attack is   present which will also be monitored.    2. Parkinson disease. Will continue with the Sinemet for now. 3. Dyslipidemia. Continue with the Zocor.          José Whipple MD      FR / TB   D:  11/19/2017   18:58   T:  11/19/2017   21:35   Job #:  [id number]

## 2017-11-20 NOTE — ROUTINE PROCESS
TRANSFER - OUT REPORT:    Verbal report given to [address] [place] [address] Alen, RN(name) on [address] [facility] [address]  being transferred to [facility](unit) for routine progression of care       Report consisted of patients Situation, Background, Assessment and   Recommendations(SBAR). Information from the following report(s) SBAR was reviewed with the receiving nurse. Lines:   Peripheral IV 11/19/17 Right Antecubital (Active)   Site Assessment Clean, dry, & intact 11/19/2017  6:09 PM   Phlebitis Assessment 0 11/19/2017  6:09 PM   Infiltration Assessment 0 11/19/2017  6:09 PM   Dressing Status Clean, dry, & intact 11/19/2017  6:09 PM   Dressing Type Transparent 11/19/2017  6:09 PM        Opportunity for questions and clarification was provided.       Patient transported with:  Monitor

## 2017-11-20 NOTE — H&P
[address] [facility] [address] [facility] [address]   HISTORY AND PHYSICAL       Name:  Long Calderon   MR#:  [id number]   :  1940   Account #:  [de-identified]        Date of Adm:  2017       PRIMARY CARE PHYSICIAN: Dr. Maicol Lopez. HISTORY OF PRESENT ILLNESS: The patient is a 25-year-old female with a past medical history of Parkinson disease, left-sided weakness due to brain surgery , history of seizure disorder, who presented to the hospital by ambulance given she does not respond to her son's call to her son and was found on the floor . Her son stated that last night he called her around 9-10 o'clock and she was saying she was doing fine . She stated that yesterday she was at a football game. She walked a lot. She went home. She stated she wanted to get out of her bed at night, but her legs were weak and so therefore she slided of   her bed to the floor. she could not stand up. Her son noted that  his mother in the ambulance had right sided  gaze  And neglecting her left side. He is under the impression that most likely his mother had an  unwitnessed seizure. He stated since his mother had brain surgery she has had seizure once in a year with a similar presentation. However, there was no reported tonic-clonic activity witnessed by EMS or at the ER. Currently the patient complains that her lower extremities are weak, more on the left   side than the right side. Upon my examination it came to my attention that patient negleting her left leg, and when I am asking to raise her left leg she raises her left arm and sometimes her right leg. PAST MEDICAL HISTORY:   1. Seizure disorder. 2. Has had brain surgery. 3. Parkinson disease. 4. History of coronary artery disease, status post myocardial infarction   x2. According to her son one of them occurred during the brain surgery   and the other one occurred during cholecystectomy.      PAST SURGICAL HISTORY: History of cholecystectomy, partial   hysterectomy, history of craniotomy for benign brain tumor. SOCIAL HISTORY: The patient never smoked, never used any drugs. He used to work for [employer]. FAMILY HISTORY: unremarkable according to her son. HOME MEDICATIONS:   1. Furosemide 20 mg daily. 2. Os-Saurav 250 mg daily. 3. Sinemet 25/100 one tablet 2 times a day. 4. Depakote extended release 500 mg every 12 hours. 5. Keppra 1000 mg b.i.d.   6. Metoprolol 25 mg p.o. daily. 7. Simvastatin 10 mg daily. 8. Loratadine 10 mg daily. 9. Aspirin 81 mg daily. 10. Multivitamin 1 tablet daily. REVIEW OF SYSTEMS: reviewed as mentioned above, otherwise   unremarkable. PHYSICAL EXAMINATION:   VITAL SIGNS: Temperature 98, heart rate of 100, respiratory rate of   12. Currently the blood pressure is 98/82. GENERAL: The patient is alert, awake. NECK: Supple. Thyroid is nontender. HEENT: Pupils reactive to light. There is no clubbing or cyanosis. CHEST WALL: No chest wall deformity. LUNGS: Clear to auscultation anterior. CARDIOVASCULAR: There is no carotid bruit   ABDOMEN: Soft. Bowel sounds active. EXTREMITIES: There is no edema, no tenderness, no skin rash  NEUROLOGIC: Patient ignoring her left leg. Her left leg is weaker than   the right leg. Upper extremities are within normal limits. SKIN: No rashes. There is no ulcer. NEUROLOGIC: Exam reveals no focal neurological deficits. PSYCH: Mood and affect appropriate     LABS: Urinalysis unremarkable. CBC: White cell count of 9, hemoglobin of 13, hematocrit of 39, platelet   count of 346. CT of head unremarkable , no acute intracranial  Hemorrhage however, there was   evidence of chronic bilateral frontal lobe encephalomalacia. Chest x-ray: Left basilar atelectasis. EKG: Sinus rhythm. ASSESSMENT:   1. The patient presented to the hospital after she was found on the floor.  She stated that she tried to get off her bed and because her legs were so weak she slid off her bed to the floor and then she was unable to stand up. However, her son stated that en route to the hospital she had right sided gaze , neglecting the left side. He stated since his mother had brain surgery she has had seizure once in a year with a similar presentation. However, there was no reported tonic-clonic activity witnessed by EMS or at the ER. Currently the patient complains that her lower extremities are weak, more on the left side than the right side. Upon my examination it came to my attention that patient negleting her left leg, and when I am asking to raise her left leg she raises her left arm and sometimes her right leg. My plan is   place the patient for observation, cardiac monitoring, MRI of the brain. 2. History of seizure. Continue Keppra and Depakote. Neurology   consult. 3. Hypotension. For now I would hold metoprolol. I would hold Lasix. I   would bolus the patient with saline   4. History of parkinsonism. Continue levodopa/carbidopa. 5. History of hyperlipidemia. Continue Zocor. 6. History of coronary artery disease. Continue aspirin. I would hold   metoprolol if the patient remains hypotensive. 7. as per my discussion with her son is FULL CODE and he is the   POA. 8. DVT prophylaxis. Lovenox 40 mg subcutaneously.         MD AMMON Naik / Emily Mosqueda   D:  11/19/2017   23:16   T:  11/20/2017   00:14   Job #:  [id number]

## 2017-11-20 NOTE — PROGRESS NOTES
Problem: Mobility Impaired (Adult and Pediatric)  Goal: *Acute Goals and Plan of Care (Insert Text)  Physical Therapy Goals  Initiated 11/20/2017  1. Patient will move from supine to sit and sit to supine  in bed with contact guard assist within 7 day(s). 2.  Patient will transfer from bed to chair and chair to bed with contact guard assist using the least restrictive device within 7 day(s). 3.  Patient will perform sit to stand with contact guard assist within 7 day(s). 4.  Patient will ambulate with contact guard assist for 100 feet with the least restrictive device within 7 day(s). physical Therapy EVALUATION  Patient: Monae Lui (73 y.o. female)  Date: 11/20/2017  Primary Diagnosis: Seizure ([facility] [address]  Seizure ([facility] [address]  Weakness due to cerebrovascular accident [facility])        Precautions:   Fall    ASSESSMENT :  Based on the objective data described below, the patient presents with decreased functional mobility from baseline level of function. Prior to admit patient was living alone at [address] [place] [address] in independent living setting. Per patient she was ambulatory with rollator walker. Walks to dining room for meals and per patient does her own laundry and son fills her pillbox. Patient currently needing modA for supine to sit. Sit to stand with modA x 1 and additional time to complete task. Amb approx 5 feet with RW and Melissa from bed to commode and back. Patient needing modA x 2 for sit to supine and rolling with modA in bed. Based on current level of function recommend SNF rehab vs inpt rehab setting pending progress. Will continue to follow. Patient will benefit from skilled intervention to address the above impairments.   Patients rehabilitation potential is considered to be Good  Factors which may influence rehabilitation potential include:   [x]         None noted  []         Mental ability/status  []         Medical condition  []         Home/family situation and support systems  [] Safety awareness  []         Pain tolerance/management  []         Other:      PLAN :  Recommendations and Planned Interventions:  [x]           Bed Mobility Training             []    Neuromuscular Re-Education  [x]           Transfer Training                   []    Orthotic/Prosthetic Training  [x]           Gait Training                         []    Modalities  [x]           Therapeutic Exercises           []    Edema Management/Control  [x]           Therapeutic Activities            [x]    Patient and Family Training/Education  []           Other (comment):    Frequency/Duration: Patient will be followed by physical therapy  5 times a week to address goals. Discharge Recommendations: Inpatient Rehab VS Skilled Nursing Facility  Further Equipment Recommendations for Discharge: TBD     SUBJECTIVE:   Patient stated I usually do most of it by myself.     OBJECTIVE DATA SUMMARY:   HISTORY:    Past Medical History:   Diagnosis Date    Anemia     High cholesterol     History of MI (myocardial infarction)     x 2    Hx of Parkinson's disease     Hx of partial seizures     Seizures ([facility] [address]     Stroke [facility])      Past Surgical History:   Procedure Laterality Date    CARDIAC SURG PROCEDURE UNLIST      HX CRANIOTOMY       Prior Level of Function/Home Situation: Independent with functional mobility using a rollator walker.   Son checks on her and assists with pillbox  Personal factors and/or comorbidities impacting plan of care:     Home Situation  Home Environment: Independent living ([address] [place] [address])  # Steps to Enter: 0  One/Two Story Residence: Other (Comment) (7 floors; has elevators)  Living Alone: No  Support Systems: Assisted living, Child(nataly)  Patient Expects to be Discharged to[de-identified] Other (comment) (Independent living)  Current DME Used/Available at Home: Belita Gallop, rollator  Tub or Shower Type: Shower    EXAMINATION/PRESENTATION/DECISION MAKING:   Critical Behavior:  Neurologic State: Alert  Orientation Level: Oriented X4  Cognition: Appropriate safety awareness, Appropriate for age attention/concentration, Appropriate decision making     Hearing: Auditory  Auditory Impairment: None    Range Of Motion:  AROM: Generally decreased, functional                       Strength:    Strength: Generally decreased, functional     Functional Mobility:  Bed Mobility:  Rolling: Moderate assistance  Supine to Sit: Moderate assistance;Assist x2; Additional time  Sit to Supine: Moderate assistance;Assist x2  Scooting: Moderate assistance  Transfers:  Sit to Stand: Moderate assistance  Stand to Sit: Moderate assistance                       Balance:   Sitting: Intact  Standing: Impaired  Standing - Static: Fair  Standing - Dynamic : Fair  Ambulation/Gait Training:  Distance (ft): 6 Feet (ft)  Assistive Device: Gait belt;Walker, rolling  Ambulation - Level of Assistance: Minimal assistance;Assist x1     Gait Description (WDL): Exceptions to WDL  Gait Abnormalities: Decreased step clearance;Shuffling gait        Base of Support: Widened     Speed/Maryellen: Pace decreased (<100 feet/min); Shuffled; Slow  Step Length: Left shortened;Right shortened         Functional Measure:  Barthel Index:    Bathin  Bladder: 0  Bowels: 0  Groomin  Dressin  Feedin  Mobility: 0  Stairs: 0  Toilet Use: 0  Transfer (Bed to Chair and Back): 10  Total: 15       Barthel and G-code impairment scale:  Percentage of impairment CH  0% CI  1-19% CJ  20-39% CK  40-59% CL  60-79% CM  80-99% CN  100%   Barthel Score 0-100 100 99-80 79-60 59-40 20-39 1-19   0   Barthel Score 0-20 20 17-19 13-16 9-12 5-8 1-4 0      The Barthel ADL Index: Guidelines  1. The index should be used as a record of what a patient does, not as a record of what a patient could do. 2. The main aim is to establish degree of independence from any help, physical or verbal, however minor and for whatever reason. 3. The need for supervision renders the patient not independent.   4. A patient's performance should be established using the best available evidence. Asking the patient, friends/relatives and nurses are the usual sources, but direct observation and common sense are also important. However direct testing is not needed. 5. Usually the patient's performance over the preceding 24-48 hours is important, but occasionally longer periods will be relevant. 6. Middle categories imply that the patient supplies over 50 per cent of the effort. 7. Use of aids to be independent is allowed. Vishnu Sears., Barthel, MATHEW. (8078). Functional evaluation: the Barthel Index. [facility] (14)2. Valentina Barreto diane NAINA VillalbaF, Milagros Hickey., Teetee Guerra., [place], [address] [place] [address] (1999). Measuring the change indisability after inpatient rehabilitation; comparison of the responsiveness of the Barthel Index and Functional [place] Measure. Journal of Neurology, Neurosurgery, and Psychiatry, 66(4), 234-035. Marlene Espana, N.[place].A, JAQUELIN Leal, & Kashif Chao MEdiliaA. (2004.) Assessment of post-stroke quality of life in cost-effectiveness studies: The usefulness of the Barthel Index and the EuroQoL-5D. Quality of Life Research, 13, 820-06         G codes: In compliance with CMSs Claims Based Outcome Reporting, the following G-code set was chosen for this patient based on their primary functional limitation being treated: The outcome measure chosen to determine the severity of the functional limitation was the Barthel with a score of 15/100 which was correlated with the impairment scale.     ? Mobility - Walking and Moving Around:     - CURRENT STATUS: CM - 80%-99% impaired, limited or restricted    - GOAL STATUS: CL - 60%-79% impaired, limited or restricted    - D/C STATUS:  ---------------To be determined---------------          Pain:  Pain Scale 1: Numeric (0 - 10)  Pain Intensity 1: 0              Activity Tolerance:   VSS-elevated HR during activity  Please refer to the flowsheet for vital signs taken during this treatment. After treatment:   []         Patient left in no apparent distress sitting up in chair  [x]         Patient left in no apparent distress in bed  [x]         Call bell left within reach  [x]         Nursing notified  []         Caregiver present  [x]         Bed alarm activated    COMMUNICATION/EDUCATION:   The patients plan of care was discussed with: Physical Therapist, Occupational Therapist and Registered Nurse. [x]         Fall prevention education was provided and the patient/caregiver indicated understanding. [x]         Patient/family have participated as able in goal setting and plan of care. [x]         Patient/family agree to work toward stated goals and plan of care. []         Patient understands intent and goals of therapy, but is neutral about his/her participation. []         Patient is unable to participate in goal setting and plan of care.     Thank you for this referral.  Suzan Rose, PT, DPT   Time Calculation: 40 mins

## 2017-11-20 NOTE — CONSULTS
Neurology:      MRI brain completed. It shows bilateral left greater than right frontal encephalomalacia. No acute process such as stroke I could appreciate. EEG did not show any abnormal discharges. She does have bilateral lower extremity weakness more so on the left. She is very clear when she tells me that her left leg is been weaker than her right ever since 2010. The imaging seen on MRI today does correlate to her bilateral leg weakness more so on the left. No change to her anticonvulsants. No further workup from neurology perspective. I will sign off for now. Please call with any questions.     [address]
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
## 2017-11-20 NOTE — PROGRESS NOTES
Primary Nurse Blanche Juarez RN and Jesu Diaz RN performed a dual skin assessment on this patient No impairment noted  Jimmy score is 19

## 2017-11-20 NOTE — PROGRESS NOTES
Problem: Self Care Deficits Care Plan (Adult)  Goal: *Acute Goals and Plan of Care (Insert Text)  Occupational Therapy Goals  Initiated 11/20/2017   1. Patient will perform grooming standing at sink with supervision/set-up within 7 day(s). 2.  Patient will perform lower body dressing with minimal assistance/ within 7 day(s). 3.  Patient will perform bathing with min A within 7 day(s). 4.  Patient will perform toilet transfers with supervision/set-up within 7 day(s). 5.  Patient will perform all aspects of toileting with supervision/set-up within 7 day(s). 6.  Patient will participate in [employer] score by 5 points within 7 days. Occupational Therapy EVALUATION  Patient: Etta Hicks (73 y.o. female)  Date: 11/20/2017  Primary Diagnosis: Seizure ([facility] [address]  Seizure ([facility] [address]  Weakness due to cerebrovascular accident [facility])        Precautions:  Fall    ASSESSMENT :  Based on the objective data described below, the patient presents with impaired activity tolerance, standing balance, functional mobility including decrease speed s/p being found on ground at ILF. Nursing cleared for therapy. Prior to admission she was living alone at [facility] where she reports was mod indep for ADL tasks at rollator level including sponge bathing, dressing, toileting and laundry. She attended meals in dining room and her son filled her pill box weekly. Provided education on safety, sequencing and body mechanics with bed mob, self care transfer to [facility], and toileting hygiene. At this time she needs mod A for sit <> stand, up to mod A x 2 for bed mob, mod A for self care transfer to [facility] SPT, and total A for hygiene. She appears to be impacted with bowel, placed in brief and returned to bed. Mild tachycardia up to 130s after toileting and returning to bed. Discussed with nursing. Patient is present below her reported baseline and would benefit from continued therapy once medically stable.       Unable to fully complete [employer] UE Assessment as patient needing to use toilet and needing extended time for toileting. Recommend completion in next session. Patient reports baseline LUE weakness since 2010 and reports it seems to be at baseline now. Completed finger to nose with eyes closed x 5 trials, needing extensive time for both UE trials. Patient will benefit from skilled intervention to address the above impairments. Patients rehabilitation potential is considered to be Good  Factors which may influence rehabilitation potential include:   []             None noted  []             Mental ability/status  []             Medical condition  []             Home/family situation and support systems  []             Safety awareness  []             Pain tolerance/management  []             Other:      PLAN :  Recommendations and Planned Interventions:  []               Self Care Training                  []        Therapeutic Activities  []               Functional Mobility Training    []        Cognitive Retraining  []               Therapeutic Exercises           []        Endurance Activities  []               Balance Training                   []        Neuromuscular Re-Education  []               Visual/Perceptual Training     []   Home Safety Training  []               Patient Education                 []        Family Training/Education  []               Other (comment):    Frequency/Duration: Patient will be followed by occupational therapy 5 times a week to address goals. Discharge Recommendations: Rehab  Further Equipment Recommendations for Discharge: TBD rehab     SUBJECTIVE:   Patient stated I do my own laundry.  Its in the basement    OBJECTIVE DATA SUMMARY:   HISTORY:   Past Medical History:   Diagnosis Date    Anemia     High cholesterol     History of MI (myocardial infarction)     x 2    Hx of Parkinson's disease     Hx of partial seizures     Seizures ([address] [address]     Stroke [facility])      Past Surgical History: Procedure Laterality Date    CARDIAC SURG PROCEDURE UNLIST      HX CRANIOTOMY         Prior Level of Function/Environment/Context:  Prior to admission she was living alone at [facility] where she reports was mod indep for ADL tasks at rollator level including sponge bathing, dressing, toileting and laundry. She attended meals in dining room and her son filled her pill box weekly. Performance Patterns (routines, roles, habits, and rituals): laundry  Personal Interests and/or values: attending activities at [address] [place] [address]  Expanded or extensive additional review of patient history:     Home Situation  Home Environment: Independent living ([facility])  # Steps to Enter: 0  One/Two Story Residence: Other (Comment) (7 floors; has elevators)  Living Alone: No  Support Systems: Assisted living, Child(nataly)  Patient Expects to be Discharged to[de-identified] Other (comment) (Independent living)  Current DME Used/Available at Home: Deloris Severe, rollator  Tub or Shower Type: Shower  [x]  Right hand dominant   []  Left hand dominant    EXAMINATION OF PERFORMANCE DEFICITS:  Cognitive/Behavioral Status:  Neurologic State: Alert  Orientation Level: Oriented X4  Cognition: Appropriate safety awareness; Appropriate for age attention/concentration; Appropriate decision making       Skin: visible uppers intact    Edema: upper snone    Hearing: Auditory  Auditory Impairment: None    Vision/Perceptual:         DNT secondary to extended time on commode  Vision WDL to complete transfers and reach appropriately to walker       Range of Motion:  AROM: Generally decreased, functional    Mild impairments in full external rotation           Strength:  Strength: Generally decreased, functional- BUE   LUE<RUE    Coordination:     Fine Motor Skills-Upper: Left Intact; Right Intact (slow)    Gross Motor Skills-Upper: Left Intact; Right Intact (slow)    Tone & Sensation:   Tone: normal BUE, slow pace with UE/LE movement- additional time   Sesnation: intact to touch BUE    Balance:  Sitting: Intact  Standing: Impaired  Standing - Static: Fair  Standing - Dynamic : Fair    Functional Mobility and Transfers for ADLs:  Bed Mobility:  Rolling: Moderate assistance  Supine to Sit: Moderate assistance;Assist x2; Additional time  Sit to Supine: Moderate assistance;Assist x2  Scooting: Moderate assistance    Transfers:  Sit to Stand: Moderate assistance  Stand to Sit: Moderate assistance    ADL Assessment:  Feeding: Setup    Oral Facial Hygiene/Grooming: Setup    Bathing: Maximum assistance- inferred from LB dressing assessment with inability to reach distally for socks, impaired standing balance/tolernce    Upper Body Dressing: Minimum assistance- inferred from AROM, sitting balance and act tolerance    Lower Body Dressing: Total assistance-    Toileting: Maximum assistance                ADL Intervention and task modifications:       Provided education on safety, sequencing and body mechanics with bed mob, self care transfer to [facility], and toileting hygiene. Patient needing additonal time for all aspects of tasks secondary to decrease motor speed/planning. Mod A for sit <> stand with RW, mod A for self care transfer to [facility] SPT, and total A for hygiene. Sit > supine with mAx A x2 secondary to fatigue. Toileting  Bowel Hygiene: Total assistance (dependent)  Clothing Management: Total assistance (dependent)      Functional Measure:  Barthel Index:    Bathin  Bladder: 0  Bowels: 0  Groomin  Dressin  Feedin  Mobility: 0  Stairs: 0  Toilet Use: 0  Transfer (Bed to Chair and Back): 10  Total: 15       Barthel and G-code impairment scale:  Percentage of impairment CH  0% CI  1-19% CJ  20-39% CK  40-59% CL  60-79% CM  80-99% CN  100%   Barthel Score 0-100 100 99-80 79-60 59-40 20-39 1-19   0   Barthel Score 0-20 20 17-19 13-16 9-12 5-8 1-4 0      The Barthel ADL Index: Guidelines  1.  The index should be used as a record of what a patient does, not as a record of what a patient could do. 2. The main aim is to establish degree of independence from any help, physical or verbal, however minor and for whatever reason. 3. The need for supervision renders the patient not independent. 4. A patient's performance should be established using the best available evidence. Asking the patient, friends/relatives and nurses are the usual sources, but direct observation and common sense are also important. However direct testing is not needed. 5. Usually the patient's performance over the preceding 24-48 hours is important, but occasionally longer periods will be relevant. 6. Middle categories imply that the patient supplies over 50 per cent of the effort. 7. Use of aids to be independent is allowed. Hilda Luke., Barthel, D.W. (8943). Functional evaluation: the Barthel Index. [address] [facility] (14)2. Angelina Alexis diane MARY JANE Villalba, Radha Morales., Lonnie Brown., [place], [address] [place] [address] (1999). Measuring the change indisability after inpatient rehabilitation; comparison of the responsiveness of the Barthel Index and Functional Sharkey Measure. Journal of Neurology, Neurosurgery, and Psychiatry, 66(4), 151-402. Jeison Jaramillo, N.J.A, JULISSA Leal.ANGELA, & Maryanne Morris, M.A. (2004.) Assessment of post-stroke quality of life in cost-effectiveness studies: The usefulness of the Barthel Index and the EuroQoL-5D. Quality of Life Research, 13, 172-11         G codes: In compliance with CMSs Claims Based Outcome Reporting, the following G-code set was chosen for this patient based on their primary functional limitation being treated: The outcome measure chosen to determine the severity of the functional limitation was the Barthel Index with a score of 15/100 which was correlated with the impairment scale. ?  Self Care:     - CURRENT STATUS: CM - 80%-99% impaired, limited or restricted    - GOAL STATUS: CL - 60%-79% impaired, limited or restricted    - D/C STATUS:  ---------------To be determined---------------     Occupational Therapy Evaluation Charge Determination   History Examination Decision-Making   LOW Complexity : Brief history review  MEDIUM Complexity : 3-5 performance deficits relating to physical, cognitive , or psychosocial skils that result in activity limitations and / or participation restrictions MEDIUM Complexity : Patient may present with comorbidities that affect occupational performnce. Miniml to moderate modification of tasks or assistance (eg, physical or verbal ) with assesment(s) is necessary to enable patient to complete evaluation       Based on the above components, the patient evaluation is determined to be of the following complexity level: MEDIUM  Pain:  Pain Scale 1: Numeric (0 - 10)  Pain Intensity 1: 0        Activity Tolerance:   BP with mild increase supine and sitting. Tachycardic up to 130s following toileting. Resting -106 at beginning of session. After treatment:   [] Patient left in no apparent distress sitting up in chair  [x] Patient left in no apparent distress in bed  [x] Call bell left within reach  [x] Nursing notified  [x] Caregiver present  [] Bed alarm activated    COMMUNICATION/EDUCATION:   The patients plan of care was discussed with: Physical Therapist, Registered Nurse and Patient. [x] Home safety education was provided and the patient/caregiver indicated understanding. [] Patient/family have participated as able in goal setting and plan of care. [x] Patient/family agree to work toward stated goals and plan of care. [] Patient understands intent and goals of therapy, but is neutral about his/her participation. [] Patient is unable to participate in goal setting and plan of care. This patients plan of care is appropriate for delegation to [facility].     Thank you for this referral.  Steve Morfin, OT  Time Calculation: 41 mins

## 2017-11-20 NOTE — PROGRESS NOTES
Admission Medication Reconciliation:    Information obtained from: patient's son     Significant PMH/Disease States:   Past Medical History:   Diagnosis Date    Anemia     High cholesterol     History of MI (myocardial infarction)     x 2    Hx of Parkinson's disease     Hx of partial seizures     Seizures ([facility] [address]     Stroke [facility])        Chief Complaint for this Admission:  fall - prolonged time until found     Allergies:  Latex; Amoxicillin; Penicillins; Prednisone; and Sulfa (sulfonamide antibiotics)    Prior to Admission Medications:   Prior to Admission Medications   Prescriptions Last Dose Informant Patient Reported? Taking?   aspirin delayed-release 81 mg tablet 11/18/2017 at am  Yes Yes   Sig: Take 81 mg by mouth daily. calcium carbonate (OS-MOIZ) 500 mg calcium (1,250 mg) tablet 11/18/2017 at am  Yes Yes   Sig: Take 1 Tab by mouth Daily (before breakfast). carbidopa-levodopa (SINEMET)  mg per tablet 11/18/2017 at am  Yes Yes   Sig: Take 1 Tab by mouth daily. divalproex ER (DEPAKOTE ER) 250 mg ER tablet 11/18/2017 at pm  Yes Yes   Sig: Take 500 mg by mouth every twelve (12) hours. furosemide (LASIX) 20 mg tablet   Yes Yes   Sig: Take 20 mg by mouth daily as needed. levETIRAcetam 1,000 mg tablet 11/18/2017 at pm  Yes Yes   Sig: Take 1,000 mg by mouth two (2) times a day. loratadine (CLARITIN) 10 mg tablet 11/18/2017 at am  Yes Yes   Sig: Take 10 mg by mouth daily. metoprolol tartrate (LOPRESSOR) 25 mg tablet 11/18/2017 at pm  Yes Yes   Sig: Take 25 mg by mouth two (2) times a day. multivitamin (ONE A DAY) tablet 11/18/2017 at am  Yes Yes   Sig: Take 1 Tab by mouth daily. simvastatin (ZOCOR) 10 mg tablet 11/18/2017 at pm  Yes Yes   Sig: Take 10 mg by mouth nightly. Facility-Administered Medications: None         Comments/Recommendations:    This medication history was obtained from the patient's son; he appears to be a decent historian and he brought a list of medications to the [facility].  An Brianna Sluder is not available. Inpatient orders were reviewed and no changes are needed. Thank you for allowing me to participate in the care of this patient. Please contact the pharmacy () or the medication reconciliation pharmacy () with any questions. Aye Sharp, Pharm. D., BCPS, BCPPS

## 2017-11-20 NOTE — PROGRESS NOTES
Bedside shift change report given to Jonny Darling RN (oncoming nurse) by July Cabrera RN (offgoing nurse).  Report included the following information SBAR, Kardex, ED Summary, Procedure Summary, Intake/Output, MAR, Accordion, Recent Results and Cardiac Rhythm ST.

## 2017-11-21 ENCOUNTER — APPOINTMENT (OUTPATIENT)
Dept: GENERAL RADIOLOGY | Age: 77
End: 2017-11-21
Attending: INTERNAL MEDICINE
Payer: MEDICARE

## 2017-11-21 ENCOUNTER — APPOINTMENT (OUTPATIENT)
Dept: ULTRASOUND IMAGING | Age: 77
End: 2017-11-21
Attending: INTERNAL MEDICINE
Payer: MEDICARE

## 2017-11-21 LAB
ANION GAP SERPL CALC-SCNC: 9 MMOL/L (ref 5–15)
APPEARANCE UR: ABNORMAL
BACTERIA URNS QL MICRO: NEGATIVE /HPF
BILIRUB UR QL: NEGATIVE
BUN SERPL-MCNC: 14 MG/DL (ref 6–20)
BUN/CREAT SERPL: 16 (ref 12–20)
CALCIUM SERPL-MCNC: 9 MG/DL (ref 8.5–10.1)
CHLORIDE SERPL-SCNC: 105 MMOL/L (ref 97–108)
CK SERPL-CCNC: 339 U/L (ref 26–192)
CO2 SERPL-SCNC: 26 MMOL/L (ref 21–32)
COLOR UR: ABNORMAL
CREAT SERPL-MCNC: 0.85 MG/DL (ref 0.55–1.02)
EPITH CASTS URNS QL MICRO: ABNORMAL /LPF
GLUCOSE SERPL-MCNC: 171 MG/DL (ref 65–100)
GLUCOSE UR STRIP.AUTO-MCNC: 500 MG/DL
HGB UR QL STRIP: ABNORMAL
KETONES UR QL STRIP.AUTO: 15 MG/DL
LEUKOCYTE ESTERASE UR QL STRIP.AUTO: ABNORMAL
NITRITE UR QL STRIP.AUTO: NEGATIVE
PH UR STRIP: 7 [PH] (ref 5–8)
POTASSIUM SERPL-SCNC: 3.4 MMOL/L (ref 3.5–5.1)
PROT UR STRIP-MCNC: 30 MG/DL
RBC #/AREA URNS HPF: >100 /HPF (ref 0–5)
SODIUM SERPL-SCNC: 140 MMOL/L (ref 136–145)
SP GR UR REFRACTOMETRY: 1.02 (ref 1–1.03)
UR CULT HOLD, URHOLD: NORMAL
UROBILINOGEN UR QL STRIP.AUTO: 1 EU/DL (ref 0.2–1)
WBC URNS QL MICRO: ABNORMAL /HPF (ref 0–4)

## 2017-11-21 PROCEDURE — 99218 HC RM OBSERVATION: CPT

## 2017-11-21 PROCEDURE — 73630 X-RAY EXAM OF FOOT: CPT

## 2017-11-21 PROCEDURE — 97530 THERAPEUTIC ACTIVITIES: CPT

## 2017-11-21 PROCEDURE — 73610 X-RAY EXAM OF ANKLE: CPT

## 2017-11-21 PROCEDURE — 87077 CULTURE AEROBIC IDENTIFY: CPT | Performed by: INTERNAL MEDICINE

## 2017-11-21 PROCEDURE — 96372 THER/PROPH/DIAG INJ SC/IM: CPT

## 2017-11-21 PROCEDURE — 97116 GAIT TRAINING THERAPY: CPT

## 2017-11-21 PROCEDURE — 76770 US EXAM ABDO BACK WALL COMP: CPT

## 2017-11-21 PROCEDURE — 36415 COLL VENOUS BLD VENIPUNCTURE: CPT | Performed by: INTERNAL MEDICINE

## 2017-11-21 PROCEDURE — 80048 BASIC METABOLIC PNL TOTAL CA: CPT | Performed by: INTERNAL MEDICINE

## 2017-11-21 PROCEDURE — 74011250636 HC RX REV CODE- 250/636: Performed by: INTERNAL MEDICINE

## 2017-11-21 PROCEDURE — 87186 SC STD MICRODIL/AGAR DIL: CPT | Performed by: INTERNAL MEDICINE

## 2017-11-21 PROCEDURE — 74011250637 HC RX REV CODE- 250/637: Performed by: INTERNAL MEDICINE

## 2017-11-21 PROCEDURE — 87086 URINE CULTURE/COLONY COUNT: CPT | Performed by: INTERNAL MEDICINE

## 2017-11-21 PROCEDURE — 81001 URINALYSIS AUTO W/SCOPE: CPT | Performed by: INTERNAL MEDICINE

## 2017-11-21 PROCEDURE — 82550 ASSAY OF CK (CPK): CPT | Performed by: INTERNAL MEDICINE

## 2017-11-21 RX ORDER — DILTIAZEM HYDROCHLORIDE 30 MG/1
30 TABLET, FILM COATED ORAL EVERY 12 HOURS
Status: DISCONTINUED | OUTPATIENT
Start: 2017-11-21 | End: 2017-11-21

## 2017-11-21 RX ORDER — SODIUM CHLORIDE 9 MG/ML
50 INJECTION, SOLUTION INTRAVENOUS CONTINUOUS
Status: DISCONTINUED | OUTPATIENT
Start: 2017-11-21 | End: 2017-11-22 | Stop reason: HOSPADM

## 2017-11-21 RX ORDER — LIDOCAINE 50 MG/G
1 PATCH TOPICAL EVERY 24 HOURS
Status: DISCONTINUED | OUTPATIENT
Start: 2017-11-21 | End: 2017-11-22 | Stop reason: HOSPADM

## 2017-11-21 RX ORDER — DILTIAZEM HYDROCHLORIDE 30 MG/1
30 TABLET, FILM COATED ORAL 2 TIMES DAILY
Status: DISCONTINUED | OUTPATIENT
Start: 2017-11-21 | End: 2017-11-21

## 2017-11-21 RX ORDER — METOPROLOL TARTRATE 25 MG/1
25 TABLET, FILM COATED ORAL 2 TIMES DAILY
Status: DISCONTINUED | OUTPATIENT
Start: 2017-11-21 | End: 2017-11-22 | Stop reason: HOSPADM

## 2017-11-21 RX ORDER — FACIAL-BODY WIPES
10 EACH TOPICAL DAILY PRN
Status: DISCONTINUED | OUTPATIENT
Start: 2017-11-21 | End: 2017-11-22 | Stop reason: HOSPADM

## 2017-11-21 RX ORDER — DILTIAZEM HYDROCHLORIDE 30 MG/1
30 TABLET, FILM COATED ORAL
Status: DISCONTINUED | OUTPATIENT
Start: 2017-11-22 | End: 2017-11-22 | Stop reason: HOSPADM

## 2017-11-21 RX ORDER — POTASSIUM CHLORIDE 750 MG/1
40 TABLET, FILM COATED, EXTENDED RELEASE ORAL
Status: COMPLETED | OUTPATIENT
Start: 2017-11-21 | End: 2017-11-21

## 2017-11-21 RX ADMIN — Medication 10 ML: at 22:24

## 2017-11-21 RX ADMIN — Medication 10 ML: at 07:35

## 2017-11-21 RX ADMIN — METOPROLOL TARTRATE 25 MG: 25 TABLET ORAL at 17:32

## 2017-11-21 RX ADMIN — LORATADINE 10 MG: 10 TABLET ORAL at 12:43

## 2017-11-21 RX ADMIN — SODIUM CHLORIDE 50 ML/HR: 900 INJECTION, SOLUTION INTRAVENOUS at 18:38

## 2017-11-21 RX ADMIN — CARBIDOPA AND LEVODOPA 1 TABLET: 25; 100 TABLET, ORALLY DISINTEGRATING ORAL at 22:24

## 2017-11-21 RX ADMIN — SIMVASTATIN 10 MG: 10 TABLET, FILM COATED ORAL at 22:24

## 2017-11-21 RX ADMIN — CARBIDOPA AND LEVODOPA 1 TABLET: 25; 100 TABLET, ORALLY DISINTEGRATING ORAL at 12:46

## 2017-11-21 RX ADMIN — DILTIAZEM HYDROCHLORIDE 30 MG: 30 TABLET, FILM COATED ORAL at 15:47

## 2017-11-21 RX ADMIN — POTASSIUM CHLORIDE 40 MEQ: 750 TABLET, FILM COATED, EXTENDED RELEASE ORAL at 17:32

## 2017-11-21 RX ADMIN — CALCIUM 500 MG: 500 TABLET ORAL at 07:35

## 2017-11-21 RX ADMIN — LEVETIRACETAM 1000 MG: 500 TABLET, FILM COATED ORAL at 12:43

## 2017-11-21 RX ADMIN — BISACODYL 10 MG: 10 SUPPOSITORY RECTAL at 10:37

## 2017-11-21 RX ADMIN — ENOXAPARIN SODIUM 40 MG: 40 INJECTION SUBCUTANEOUS at 22:24

## 2017-11-21 RX ADMIN — DIVALPROEX SODIUM 500 MG: 500 TABLET, FILM COATED, EXTENDED RELEASE ORAL at 12:43

## 2017-11-21 RX ADMIN — CARBIDOPA AND LEVODOPA 1 TABLET: 25; 100 TABLET, ORALLY DISINTEGRATING ORAL at 17:32

## 2017-11-21 RX ADMIN — CARBIDOPA AND LEVODOPA 1 TABLET: 25; 100 TABLET, ORALLY DISINTEGRATING ORAL at 00:01

## 2017-11-21 RX ADMIN — DIVALPROEX SODIUM 500 MG: 500 TABLET, FILM COATED, EXTENDED RELEASE ORAL at 22:24

## 2017-11-21 RX ADMIN — METOPROLOL TARTRATE 25 MG: 25 TABLET ORAL at 12:42

## 2017-11-21 RX ADMIN — LEVETIRACETAM 1000 MG: 500 TABLET, FILM COATED ORAL at 17:32

## 2017-11-21 NOTE — PROGRESS NOTES
Problem: Mobility Impaired (Adult and Pediatric)  Goal: *Acute Goals and Plan of Care (Insert Text)  Physical Therapy Goals  Initiated 11/20/2017  1. Patient will move from supine to sit and sit to supine  in bed with contact guard assist within 7 day(s). 2.  Patient will transfer from bed to chair and chair to bed with contact guard assist using the least restrictive device within 7 day(s). 3.  Patient will perform sit to stand with contact guard assist within 7 day(s). 4.  Patient will ambulate with contact guard assist for 100 feet with the least restrictive device within 7 day(s). physical Therapy TREATMENT  Patient: Neal Germain (73 y.o. female)  Date: 11/21/2017  Diagnosis: Seizure ([facility] [address]  Seizure ([facility] [address]  Weakness due to cerebrovascular accident [facility])  Weakness due to cerebrovascular accident [facility]) <principal problem not specified>       Precautions: Fall    ASSESSMENT:  Patient in bed upon arrival and requested to ambulate to the bathroom to attempt bowel movement. Patient required mod assist x 1 for supine to sit, mostly due to abdominal discomfort (posterior lean initially in sitting). Patient then stood with min assist at [address] [place] [address] and ambulated 15 ft x 2 using RW with CGA. Patient able to stand and attempt sy-care after bowel movement with CGA. Patient left up in chair at end of session; RN notified. Recommend SNF rehab at discharge. Progression toward goals:  []    Improving appropriately and progressing toward goals  [x]    Improving slowly and progressing toward goals  []    Not making progress toward goals and plan of care will be adjusted     PLAN:  Patient continues to benefit from skilled intervention to address the above impairments. Continue treatment per established plan of care. Discharge Recommendations:  Mani Shipley  Further Equipment Recommendations for Discharge:  none     SUBJECTIVE:   Patient stated You all have those old-fashioned walkers here.  They aren't as nice as my rollator.     OBJECTIVE DATA SUMMARY:   Critical Behavior:  Neurologic State: Alert  Orientation Level: Oriented to person, Oriented to place  Cognition: Impaired decision making     Functional Mobility Training:  Bed Mobility:  Rolling: Moderate assistance;Assist x1  Supine to Sit: Moderate assistance; Additional time;Assist x1  Sit to Supine:  (NT - OOB to chair)           Transfers:  Sit to Stand: Minimum assistance; Additional time;Assist x1  Stand to Sit: Minimum assistance;Assist x1                             Balance:  Sitting: Intact  Standing: Impaired; With support  Standing - Static: Good  Standing - Dynamic : Fair  Ambulation/Gait Training:  Distance (ft): 15 Feet (ft) (15 ft x 2 with seated rest break on commode)     Ambulation - Level of Assistance: Contact guard assistance        Gait Abnormalities: Decreased step clearance        Base of Support: Widened     Speed/Maryellen: Shuffled; Slow  Step Length: Left shortened;Right shortened       Pain:  Pain Scale 1: Numeric (0 - 10)  Pain Intensity 1: 0              Activity Tolerance:   HR elevated while attempting bowel movement; RN aware  Please refer to the flowsheet for vital signs taken during this treatment.   After treatment:   [x]    Patient left in no apparent distress sitting up in chair  []    Patient left in no apparent distress in bed  [x]    Call bell left within reach  [x]    Nursing notified  []    Caregiver present  [x]    Bed alarm activated    COMMUNICATION/COLLABORATION:   The patients plan of care was discussed with: Registered Nurse    Gera Stout PT   Time Calculation: 40 mins

## 2017-11-21 NOTE — PROGRESS NOTES
[facility] [address] [facility], [address] [place] [address]   [phone]                                   Hospitalist Service  Progress Note    Daily Progress Note: 11/21/2017    Assessment/Plan:   75 y/o F patient presented to the hospital after she was found on the floor. She stated that she tried to get off her bed and because her legs were so weak she slid off her bed to the floor and then she was unable to stand up. However, her son stated that en route to the hospital she had right sided gaze , neglecting the left side. He stated since his mother had brain surgery she has had seizure once in a year with a similar presentation. However, there was no reported tonic-clonic activity witnessed by EMS or at the ER. Currently the patient complains that her lower extremities are weak, more on the left side than the right side. Upon my examination it came to my attention that patient negleting her left leg, and when I am asking to raise her left leg she raises her left arm and sometimes her right leg.     1 Hx of CVA with LLE weakness at baseline - MRI of the brain (11/20) with no evidence of acute findings still the patient report that her left leg is significantly weaker than the baseline . She was able to ambulate  With walker /cane but now  She can't raise his left leg spoke with Dr Jane Almonte no further work up is necessary. Continue ASA   PT / OT consulted and  for SNIF placement       2. History of seizure. Continue Keppra and Depakote. Neurology   consult. 3. Hypotension POA, in known HTN - resume metoprolol, hold Lasix  To prevent low BP's  4. History of parkinsonism. Continue levodopa/carbidopa. 5. History of hyperlipidemia, Elevated CK levels - on ivf for hydration, likely from fall, Continue Zocor. 6. History of GERD. On PPI. 7. as per my discussion with her son is FULL CODE and he is the POA.    8. L knee & ankle pain s/p fall - L Knee & L Hip Xray neg on 17. L ankle & hip Xray. 9. Hematuria - repeat U/a, renal USS. 10. Hypokalemia - replace po. 11. DVT prophylaxis. Lovenox 40 mg subcutaneously. Dispo - PT / OT consulted likely will need SNIF ([facility]) vs [facility]CIE placement, CM to help with auth in pts SNIF facility of choice. [facility] [phone], uodated on 17 of the plan of care that pt may be denied [facility]E due to unability to participate for 3 [facility]. PTA pt was in indep [facility] at [facility]. Subjective:   Feels better left leg weakness, per RN pt has hematuria.     Review of Systems:       Objective:   Physical examination  Visit Vitals    /78 (BP 1 Location: Right arm, BP Patient Position: At rest)    Pulse (!) 122    Temp 97.7 °F (36.5 °C)    Resp 18    Ht 5' 4\" (1.626 m)    Wt 76.9 kg (169 lb 8.5 oz)    SpO2 95%    Breastfeeding No    BMI 29.1 kg/m2      Temp (24hrs), Av.7 °F (37.1 °C), Min:97.7 °F (36.5 °C), Max:99.6 °F (37.6 °C)         O2 Device: Room air  Patient Vitals for the past 24 hrs:   Temp Pulse Resp BP SpO2   17 1442 97.7 °F (36.5 °C) (!) 122 18 134/78 95 %   17 0924 98.4 °F (36.9 °C) (!) 105 18 118/63 94 %   17 0400 99 °F (37.2 °C) (!) 104 18 122/75 95 %   17 2133 99.6 °F (37.6 °C) (!) 104 18 116/76 96 %   17 1803 - 100 - - -       Intake/Output Summary (Last 24 hours) at 17 1553  Last data filed at 17 1252   Gross per 24 hour   Intake              360 ml   Output                0 ml   Net              360 ml     Last shift:     0701 - 1900  In: 360 [P.O.:360]  Out: -   Last 3 shifts:         General:   Alert, cooperative, no acute distress   Head:   Atraumatic   Eyes:   Conjunctivae clear   ENT:  Oral mucosa normal   Neck:  Supple, trachea midline, no adenopathy   No JVD   Back:    No CVA tenderness    Chest wall:    No tenderness or deformities    Lungs:   Clear to auscultation bilaterally    Heart:   Regular rhythm, no murmur   Abdomen:    Soft, non-tender   No masses or organomegaly    Extremities:  No edema or DVT signs   Pulses:  Symmetric all extremities   Skin:  Warm and dry    No rashes or lesions   Neurologic:  Oriented x 3, CN 2-12 intact   Left lower extremity  Weakness     Psychiatric:    Urinary catheter:  none     Data Review:       Recent Labs      11/19/17   1802   WBC  9.4   HGB  13.6   HCT  39.5   PLT  182     Recent Labs      11/21/17   0927  11/19/17   1727   NA  140  136   K  3.4*  3.8   CL  105  101   CO2  26  24   GLU  171*  103*   BUN  14  13   CREA  0.85  0.71   CA  9.0  10.0   ALB   --   3.9   SGOT   --   57*   ALT   --   31     No results for input(s): PH, PCO2, PO2, HCO3, FIO2 in the last 72 hours. Results for Dorethea Ip (MRN [id number]) as of 11/21/2017 07:43   Ref. Range 11/19/2017 17:27   CK Latest Ref Range: 26 - 192 U/L 1597 (H)   CK - MB Latest Ref Range: <3.6 NG/ML 33.3 (H)   CK-MB Index Latest Ref Range: 0 - 2.5   2.1   Troponin-I, Qt. Latest Ref Range: <0.05 ng/mL <0.04   Valproic acid Latest Ref Range: 50 - 100 ug/ml 93   Results for Dorethea Ip (MRN [id number]) as of 11/21/2017 07:43   Ref.  Range 11/19/2017 16:25   Color Latest Units:   YELLOW/STRAW   Appearance Latest Ref Range: CLEAR   CLEAR   Specific gravity Latest Ref Range: 1.003 - 1.030   1.014   pH (UA) Latest Ref Range: 5.0 - 8.0   7.0   Protein Latest Ref Range: NEG mg/dL NEGATIVE   Glucose Latest Ref Range: NEG mg/dL 100 (A)   Ketone Latest Ref Range: NEG mg/dL 15 (A)   Blood Latest Ref Range: NEG   TRACE (A)   Bilirubin Latest Ref Range: NEG   NEGATIVE   Urobilinogen Latest Ref Range: 0.2 - 1.0 EU/dL 0.2   Nitrites Latest Ref Range: NEG   NEGATIVE   Leukocyte Esterase Latest Ref Range: NEG   NEGATIVE   Epithelial cells Latest Ref Range: FEW /lpf FEW   WBC Latest Ref Range: 0 - 4 /hpf 0-4   RBC Latest Ref Range: 0 - 5 /hpf 0-5   Bacteria Latest Ref Range: NEG /hpf NEGATIVE Hyaline cast Latest Ref Range: 0 - 5 /lpf 0-2     Lab Results   Component Value Date/Time    Glucose 171 11/21/2017 09:27 AM        All Micro Results     Procedure Component Value Units Date/Time    URINE CULTURE HOLD SAMPLE [[id number]] Collected:  11/19/17 1627    Order Status:  Completed Specimen:  Urine from Serum Updated:  11/19/17 1622     Urine culture hold         URINE ON HOLD IN MICROBIOLOGY DEPT FOR 3 DAYS        RADIOLOGY:  * HISTORY:  Fall last night. Seizure history. Post meningioma resection question  recurrent seizure      COMPARISON:  November 19, 2017 and May 10, 2016     TECHNIQUE:  MR imaging of the brain was performed with sagittal T1, axial T1,  T2, FLAIR, GRE, DWI/ADC, coronal T2. Coronal FLAIR     FINDINGS:       Ventricles are enlarged compatible with the overall degree of volume loss. There is encephalomalacia in the bilateral frontal lobes, postsurgical in  nature, unchanged. There is volume loss in the anterior temporal lobes  bilaterally, unchanged. There is no mass effect or midline shift. . . There is no  acute infarction. There is no acute or chronic intracranial hemorrhage. The  major intracranial vascular flow-voids are patent. Marrow signal is normal.  Patient is post frontal craniotomy.     IMPRESSION  IMPRESSION:  1. No change in encephalomalacia and postoperative changes to the anterior  cranial fossa. No recurrent mass lesion  2. Otherwise no acute intracranial abnormality  Signed Date/Time    Phone Pager     Aysha Murillo 11/20/2017 08:14 [phone]     * Indication:  Possible seizure / Fall      Comparison: 5/9/2016     Findings: 5 mm axial images were obtained from the skull base through the  vertex.      CT dose reduction was achieved through the use of a standardized protocol  tailored for this examination and automatic exposure control for dose  modulation.     The ventricles and cortical sulci are prominent, compatible with age related  volume loss.  There are chronic postsurgical changes with bilateral inferior  frontal lobe encephalomalacia. There is no evidence of intracranial hemorrhage,  mass, mass effect, or acute infarct. There is periventricular white matter  disease. No extra-axial fluid collections are seen. The visualized paranasal  sinuses and mastoid air cells are clear. The orbital structures are  unremarkable. No osseous abnormalities are seen.      IMPRESSION  Impression:   1. No evidence of acute infarct or intracranial hemorrhage. 2. Chronic bilateral frontal lobe encephalomalacia. Signed Date/Time    Phone Pager     Darrylramon Lux S 11/19/2017 18:04 [phone]     * INDICATION:  weakness      FINDINGS: Single AP portable view of the chest obtained at 1702 demonstrates a  normal cardiomediastinal silhouette. The lungs are hypoinspiratory and there is  left basilar atelectasis. No osseous abnormalities are seen.     IMPRESSION  IMPRESSION: Left basilar atelectasis. Signed Date/Time    Phone Pager   RAMONmarco amoreliacarlos RAMON, [address] 11/19/2017 17:16 [phone]     * EXAM:  XR HIP LT W OR WO PELV 2-3 VWS     INDICATION:   Trauma. Slid off bed today and landed on left hip with pain     COMPARISON: None.     FINDINGS: An AP view of the pelvis and a frogleg lateral view of the left hip  demonstrate no fracture, dislocation or other acute abnormality.     IMPRESSION  IMPRESSION:  No acute abnormality. Signed Date/Time    Phone Pager     Chen Jameset 11/19/2017 17:16 [phone]     * EXAM:  XR KNEE LT 3 V     INDICATION:   Trauma. Danielle Lars out of bed with left knee pain     COMPARISON: None.     FINDINGS: Three views of the left knee demonstrate no fracture or malalignment. There are degenerative changes of the medial and patellofemoral compartments. Bones are osteopenic. There is no effusion.     IMPRESSION  IMPRESSION:  No acute abnormality.   Signed Date/Time    Phone Pager     Chen Jonas 11/19/2017 17:17 [phone]     Medications reviewed  Current Facility-Administered Medications   Medication Dose Route Frequency    metoprolol tartrate (LOPRESSOR) tablet 25 mg  25 mg Oral BID    bisacodyl (DULCOLAX) suppository 10 mg  10 mg Rectal DAILY PRN    dilTIAZem (CARDIZEM) IR tablet 30 mg  30 mg Oral Q12H    sodium chloride (NS) flush 5-10 mL  5-10 mL IntraVENous Q8H    sodium chloride (NS) flush 5-10 mL  5-10 mL IntraVENous PRN    enoxaparin (LOVENOX) injection 40 mg  40 mg SubCUTAneous Q24H    calcium carbonate (OS-MOIZ) tablet 500 mg [elemental]  500 mg Oral ACB    divalproex ER (DEPAKOTE ER) 24 hour tablet 500 mg  500 mg Oral Q12H    levETIRAcetam (KEPPRA) tablet 1,000 mg  1,000 mg Oral BID    simvastatin (ZOCOR) tablet 10 mg  10 mg Oral QHS    loratadine (CLARITIN) tablet 10 mg  10 mg Oral DAILY    0.9% sodium chloride infusion  125 mL/hr IntraVENous CONTINUOUS    carbidopa-levodopa (PARCOPA)  mg rapid dissolve tablet 1 Tab  1 Tab Oral TID         Signed:   Allyson Oliveros MD   Internist / Lucia Espana

## 2017-11-21 NOTE — PROGRESS NOTES
Bedside shift change report given to Minnie Wolff (oncoming nurse) by Sofía Alamo (offgoing nurse). Report included the following information SBAR and Kardex.

## 2017-11-21 NOTE — PROGRESS NOTES
Jose Luis spoke with [facility] MD and she would like to have a referral sent to [facility]- referral sent via [facility].  Charu Sarmiento RN CRM

## 2017-11-21 NOTE — PROGRESS NOTES
Code 44 and observation notice given to pt and signed copy placed on chart.   Charu Sarmiento RN CRM

## 2017-11-21 NOTE — PROGRESS NOTES
[facility] [address] , [place] Hospitalist Service  Progress Note    Daily Progress Note: 2017    Assessment/Plan:   A   1. The patient presented to the hospital after she was found on the floor. She stated that she tried to get off her bed and because her legs were so weak she slid off her bed to the floor and then she was unable to stand up. However, her son stated that en route to the hospital she had right sided gaze , neglecting the left side. He stated since his mother had brain surgery she has had seizure once in a year with a similar presentation. However, there was no reported tonic-clonic activity witnessed by EMS or at the ER. Currently the patient complains that her lower extremities are weak, more on the left side than the right side. Upon my examination it came to my attention that patient negleting her left leg, and when I am asking to raise her left leg she raises her left arm and sometimes her right leg.         My plan is   1- MRI of the brain no evidence of acute findings still the patient report that her left leg is significantly weaker than the baseline . She was able to ambulate  With walker /cane but now  She can't raise his left leg spoke with Dr Sterling Hair no further work up is necessary  Asking for PT consult and  for placement       2. History of seizure. Continue Keppra and Depakote. Neurology   consult. 3. Hypotension POA no  resolved   resume metoprolol  And  Lasix tomorrow if remains stable    . History of parkinsonism. Continue levodopa/carbidopa. 5. History of hyperlipidemia. Continue Zocor. 6. History of coronary artery disease. Continue aspirin.  hold   metoprolol   7. as per my discussion with her son is FULL CODE and he is the   POA. 8. DVT prophylaxis. Lovenox 40 mg subcutaneously.              Subjective:   Feels better results for input(s): PH, PCO2, PO2, HCO3, FIO2 in the last 72 hours.     Lab Results   Component Value Date/Time    Glucose 103 11/19/2017 05:27 PM        All Micro Results     Procedure Component Value Units Date/Time    URINE CULTURE HOLD SAMPLE [[id number]] Collected:  11/19/17 1625    Order Status:  Completed Specimen:  Urine from Serum Updated:  11/19/17 1629     Urine culture hold         URINE ON HOLD IN MICROBIOLOGY DEPT FOR 3 DAYS        Lab Results   Component Value Date/Time    Specimen Description: URINE 06/20/2011 04:35 PM    Specimen Description: URINE 06/16/2011 09:33 PM     Lab Results   Component Value Date/Time    Culture result: NO SIGNIFICANT GROWTH 06/20/2011 04:35 PM    Culture result: NO GROWTH 1 DAY 06/16/2011 09:33 PM     Medications reviewed  Current Facility-Administered Medications   Medication Dose Route Frequency    sodium chloride (NS) flush 5-10 mL  5-10 mL IntraVENous Q8H    sodium chloride (NS) flush 5-10 mL  5-10 mL IntraVENous PRN    enoxaparin (LOVENOX) injection 40 mg  40 mg SubCUTAneous Q24H    calcium carbonate (OS-MOIZ) tablet 500 mg [elemental]  500 mg Oral ACB    divalproex ER (DEPAKOTE ER) 24 hour tablet 500 mg  500 mg Oral Q12H    levETIRAcetam (KEPPRA) tablet 1,000 mg  1,000 mg Oral BID    metoprolol tartrate (LOPRESSOR) tablet 25 mg  25 mg Oral BID    simvastatin (ZOCOR) tablet 10 mg  10 mg Oral QHS    loratadine (CLARITIN) tablet 10 mg  10 mg Oral DAILY    0.9% sodium chloride infusion  125 mL/hr IntraVENous CONTINUOUS    carbidopa-levodopa (PARCOPA)  mg rapid dissolve tablet 1 Tab  1 Tab Oral TID         Signed:   Vielka Westfall MD   Internist / Hospitalist

## 2017-11-21 NOTE — PROGRESS NOTES
SPEECH THERAPY SCREENING:  SERVICES ARE NOT INDICATED AT THIS TIME    [facility] therapy based on results obtained during the nursing admission assessment. The patients chart was reviewed and the patient is not appropriate for a skilled therapy evaluation at this time. Please consult speech therapy if any therapy needs arise. Thank you. Vinicius Holm M.CD.  CCC-SLP

## 2017-11-21 NOTE — PROGRESS NOTES
11/21/17 1442   Vital Signs   Temp 97.7 °F (36.5 °C)   Temp Source Oral   Pulse (Heart Rate) (!) 122   Heart Rate Source Monitor   Resp Rate 18   O2 Sat (%) 95 %   Level of Consciousness Alert   /78   MAP (Calculated) 97   BP 1 Method Automatic   BP 1 Location Right arm   BP Patient Position At rest   MEWS Score 3   Oxygen Therapy   O2 Device Room air   Patient Observation   Activity In bed;Resting quietly   MD aware HR elevated cardizem started.  Will continue to monitor

## 2017-11-21 NOTE — PROGRESS NOTES
TRANSFER - IN REPORT:    Verbal report received from Shakila Lehman (name) on [employer]  being received from Neuro Tele (unit) for routine progression of care      Report consisted of patients Situation, Background, Assessment and   Recommendations(SBAR). Information from the following report(s) SBAR, Kardex, Intake/Output, Recent Results and Med Rec Status was reviewed with the receiving nurse. Opportunity for questions and clarification was provided. Assessment completed upon patients arrival to unit and care assumed.

## 2017-11-21 NOTE — PROGRESS NOTES
Bedside shift change report given to Varsha Roberto (oncoming nurse) by David Acosta (offgoing nurse). Report included the following information SBAR, Kardex and MAR.

## 2017-11-21 NOTE — PROGRESS NOTES
CM reviewed chart. CM noted pt had complaints of being found on floor and possible seizure with history of seizures, anemia, high cholesterol, MI, parkinson's disease, stroke, and craniotomy. CM spoke with pt's son via phone. CURRENT LIVING SITUATION-  Pt lives in independent living at [facility]. Pt uses a rollator and was independent with ADL's and IADL's. Pt's son fills her pill box weekly for her and she eats in the dinning room. Pt's PCP is [facility]. Pt gets her medication filled at [facility]. CM verified with pt her insurance is [employer]. DISCHARGE PLANNING-  Pt and son request referral be sent to [facility]. CM discussed need for insurance AUTH. Pt's son states he understands and has to \"fight\" for her to get into [facility] every year. CM sent referral via [employer] to [facility]. Angely Grace RN CRM      [address] [facility] accepted pt for admission and will try for [place] [address]. Angely Grace RN CRM      Care Management Interventions  PCP Verified by CM:  Yes Matty Gastelum  Palliative Care Criteria Met (RRAT>21 & CHF Dx)?: No  Mode of Transport at Discharge: Self  Transition of Care Consult (CM Consult): Discharge Planning  MyChart Signup: No  Physical Therapy Consult: Yes  Occupational Therapy Consult: Yes  Speech Therapy Consult: No  Current Support Network: Own Home, Lives Alone  Confirm Follow Up Transport: Family  Plan discussed with Pt/Family/Caregiver: Yes  Freedom of Choice Offered: Yes  Discharge Location  Discharge Placement: Rehab hospital/unit acute

## 2017-11-22 ENCOUNTER — HOSPITAL ENCOUNTER (OUTPATIENT)
Age: 77
Discharge: HOME | End: 2017-12-01
Attending: PHYSICAL MEDICINE & REHABILITATION | Admitting: PHYSICAL MEDICINE & REHABILITATION

## 2017-11-22 VITALS
SYSTOLIC BLOOD PRESSURE: 111 MMHG | WEIGHT: 169.53 LBS | HEART RATE: 78 BPM | RESPIRATION RATE: 16 BRPM | BODY MASS INDEX: 28.94 KG/M2 | HEIGHT: 64 IN | TEMPERATURE: 98.5 F | DIASTOLIC BLOOD PRESSURE: 68 MMHG | OXYGEN SATURATION: 98 %

## 2017-11-22 LAB
ANION GAP SERPL CALC-SCNC: 8 MMOL/L (ref 5–15)
BUN SERPL-MCNC: 23 MG/DL (ref 6–20)
BUN/CREAT SERPL: 32 (ref 12–20)
CALCIUM SERPL-MCNC: 8.8 MG/DL (ref 8.5–10.1)
CHLORIDE SERPL-SCNC: 107 MMOL/L (ref 97–108)
CO2 SERPL-SCNC: 26 MMOL/L (ref 21–32)
CREAT SERPL-MCNC: 0.73 MG/DL (ref 0.55–1.02)
GLUCOSE SERPL-MCNC: 92 MG/DL (ref 65–100)
MAGNESIUM SERPL-MCNC: 2.4 MG/DL (ref 1.6–2.4)
POTASSIUM SERPL-SCNC: 4.1 MMOL/L (ref 3.5–5.1)
SODIUM SERPL-SCNC: 141 MMOL/L (ref 136–145)

## 2017-11-22 PROCEDURE — 74011250637 HC RX REV CODE- 250/637: Performed by: PHYSICAL MEDICINE & REHABILITATION

## 2017-11-22 PROCEDURE — 36415 COLL VENOUS BLD VENIPUNCTURE: CPT | Performed by: INTERNAL MEDICINE

## 2017-11-22 PROCEDURE — 97116 GAIT TRAINING THERAPY: CPT

## 2017-11-22 PROCEDURE — 83735 ASSAY OF MAGNESIUM: CPT | Performed by: INTERNAL MEDICINE

## 2017-11-22 PROCEDURE — 80048 BASIC METABOLIC PNL TOTAL CA: CPT | Performed by: INTERNAL MEDICINE

## 2017-11-22 PROCEDURE — 74011250637 HC RX REV CODE- 250/637: Performed by: INTERNAL MEDICINE

## 2017-11-22 PROCEDURE — 74011250636 HC RX REV CODE- 250/636: Performed by: PHYSICAL MEDICINE & REHABILITATION

## 2017-11-22 PROCEDURE — 99218 HC RM OBSERVATION: CPT

## 2017-11-22 RX ORDER — DILTIAZEM HYDROCHLORIDE 30 MG/1
30 TABLET, FILM COATED ORAL
Status: DISCONTINUED | OUTPATIENT
Start: 2017-11-22 | End: 2017-12-01 | Stop reason: HOSPADM

## 2017-11-22 RX ORDER — LEVETIRACETAM 500 MG/1
1000 TABLET ORAL 2 TIMES DAILY
Status: DISCONTINUED | OUTPATIENT
Start: 2017-11-22 | End: 2017-12-01 | Stop reason: HOSPADM

## 2017-11-22 RX ORDER — METOPROLOL TARTRATE 25 MG/1
25 TABLET, FILM COATED ORAL 2 TIMES DAILY
Status: DISCONTINUED | OUTPATIENT
Start: 2017-11-22 | End: 2017-12-01 | Stop reason: HOSPADM

## 2017-11-22 RX ORDER — CARBIDOPA AND LEVODOPA 25; 100 MG/1; MG/1
1 TABLET, ORALLY DISINTEGRATING ORAL 3 TIMES DAILY
Status: DISCONTINUED | OUTPATIENT
Start: 2017-11-22 | End: 2017-11-22

## 2017-11-22 RX ORDER — DIVALPROEX SODIUM 500 MG/1
500 TABLET, EXTENDED RELEASE ORAL EVERY 12 HOURS
Status: DISCONTINUED | OUTPATIENT
Start: 2017-11-22 | End: 2017-12-01 | Stop reason: HOSPADM

## 2017-11-22 RX ORDER — ACETAMINOPHEN 325 MG/1
650 TABLET ORAL
Status: DISCONTINUED | OUTPATIENT
Start: 2017-11-22 | End: 2017-12-01 | Stop reason: HOSPADM

## 2017-11-22 RX ORDER — CARBIDOPA AND LEVODOPA 25; 100 MG/1; MG/1
1 TABLET ORAL 3 TIMES DAILY
Status: DISCONTINUED | OUTPATIENT
Start: 2017-11-22 | End: 2017-12-01 | Stop reason: HOSPADM

## 2017-11-22 RX ORDER — POLYETHYLENE GLYCOL 3350 17 G/17G
17 POWDER, FOR SOLUTION ORAL DAILY PRN
Status: DISCONTINUED | OUTPATIENT
Start: 2017-11-22 | End: 2017-12-01 | Stop reason: HOSPADM

## 2017-11-22 RX ORDER — ADHESIVE BANDAGE
30 BANDAGE TOPICAL DAILY PRN
Status: DISCONTINUED | OUTPATIENT
Start: 2017-11-22 | End: 2017-12-01 | Stop reason: HOSPADM

## 2017-11-22 RX ORDER — PHENAZOPYRIDINE HYDROCHLORIDE 100 MG/1
100 TABLET, FILM COATED ORAL
Qty: 9 TAB | Refills: 0 | Status: SHIPPED | OUTPATIENT
Start: 2017-11-22 | End: 2017-11-25

## 2017-11-22 RX ORDER — AMOXICILLIN 250 MG
1 CAPSULE ORAL
Status: DISCONTINUED | OUTPATIENT
Start: 2017-11-22 | End: 2017-11-26

## 2017-11-22 RX ORDER — SENNOSIDES 25 MG/1
TABLET, FILM COATED ORAL
Qty: 1 TUBE | Refills: 0 | Status: SHIPPED | OUTPATIENT
Start: 2017-11-22 | End: 2017-12-13

## 2017-11-22 RX ORDER — ONDANSETRON 4 MG/1
4 TABLET, ORALLY DISINTEGRATING ORAL
Status: DISCONTINUED | OUTPATIENT
Start: 2017-11-22 | End: 2017-12-01 | Stop reason: HOSPADM

## 2017-11-22 RX ORDER — ENOXAPARIN SODIUM 100 MG/ML
40 INJECTION SUBCUTANEOUS EVERY 24 HOURS
Status: DISCONTINUED | OUTPATIENT
Start: 2017-11-22 | End: 2017-12-01 | Stop reason: HOSPADM

## 2017-11-22 RX ORDER — SIMVASTATIN 5 MG/1
10 TABLET, FILM COATED ORAL
Status: DISCONTINUED | OUTPATIENT
Start: 2017-11-22 | End: 2017-12-01 | Stop reason: HOSPADM

## 2017-11-22 RX ORDER — LEVOFLOXACIN 250 MG/1
250 TABLET ORAL EVERY 24 HOURS
Status: DISCONTINUED | OUTPATIENT
Start: 2017-11-22 | End: 2017-11-22 | Stop reason: HOSPADM

## 2017-11-22 RX ORDER — LORATADINE 10 MG/1
10 TABLET ORAL DAILY
Status: DISCONTINUED | OUTPATIENT
Start: 2017-11-23 | End: 2017-12-01 | Stop reason: HOSPADM

## 2017-11-22 RX ORDER — LEVOFLOXACIN 250 MG/1
250 TABLET ORAL EVERY 24 HOURS
Qty: 7 TAB | Refills: 0 | Status: SHIPPED | OUTPATIENT
Start: 2017-11-22

## 2017-11-22 RX ORDER — LEVOFLOXACIN 250 MG/1
250 TABLET ORAL EVERY 24 HOURS
Status: DISCONTINUED | OUTPATIENT
Start: 2017-11-23 | End: 2017-11-25

## 2017-11-22 RX ORDER — LIDOCAINE 50 MG/G
1 PATCH TOPICAL DAILY
Status: DISCONTINUED | OUTPATIENT
Start: 2017-11-22 | End: 2017-12-01 | Stop reason: HOSPADM

## 2017-11-22 RX ORDER — CALCIUM CARBONATE 500(1250)
500 TABLET ORAL
Status: DISCONTINUED | OUTPATIENT
Start: 2017-11-23 | End: 2017-12-01 | Stop reason: HOSPADM

## 2017-11-22 RX ORDER — LEVOFLOXACIN 500 MG/1
500 TABLET, FILM COATED ORAL EVERY 24 HOURS
Status: DISCONTINUED | OUTPATIENT
Start: 2017-11-22 | End: 2017-11-22 | Stop reason: DRUGHIGH

## 2017-11-22 RX ORDER — CARBIDOPA AND LEVODOPA 25; 100 MG/1; MG/1
1 TABLET ORAL 3 TIMES DAILY
Qty: 90 TAB | Refills: 0 | Status: SHIPPED | OUTPATIENT
Start: 2017-11-22

## 2017-11-22 RX ADMIN — CARBIDOPA AND LEVODOPA 1 TABLET: 25; 100 TABLET ORAL at 21:51

## 2017-11-22 RX ADMIN — ENOXAPARIN SODIUM 40 MG: 40 INJECTION SUBCUTANEOUS at 21:50

## 2017-11-22 RX ADMIN — SIMVASTATIN 10 MG: 5 TABLET, FILM COATED ORAL at 21:51

## 2017-11-22 RX ADMIN — LEVETIRACETAM 1000 MG: 500 TABLET, FILM COATED ORAL at 09:04

## 2017-11-22 RX ADMIN — LORATADINE 10 MG: 10 TABLET ORAL at 09:04

## 2017-11-22 RX ADMIN — CARBIDOPA AND LEVODOPA 1 TABLET: 25; 100 TABLET, ORALLY DISINTEGRATING ORAL at 09:04

## 2017-11-22 RX ADMIN — DILTIAZEM HYDROCHLORIDE 30 MG: 30 TABLET, FILM COATED ORAL at 18:00

## 2017-11-22 RX ADMIN — DIVALPROEX SODIUM 500 MG: 500 TABLET, FILM COATED, EXTENDED RELEASE ORAL at 09:04

## 2017-11-22 RX ADMIN — DILTIAZEM HYDROCHLORIDE 30 MG: 30 TABLET, FILM COATED ORAL at 06:36

## 2017-11-22 RX ADMIN — LEVOFLOXACIN 250 MG: 250 TABLET, FILM COATED ORAL at 11:59

## 2017-11-22 RX ADMIN — ACETAMINOPHEN 650 MG: 325 TABLET ORAL at 21:50

## 2017-11-22 RX ADMIN — DOCUSATE SODIUM AND SENNOSIDES 1 TABLET: 8.6; 5 TABLET, FILM COATED ORAL at 21:51

## 2017-11-22 RX ADMIN — LEVETIRACETAM 1000 MG: 500 TABLET ORAL at 21:51

## 2017-11-22 RX ADMIN — CALCIUM 500 MG: 500 TABLET ORAL at 06:36

## 2017-11-22 RX ADMIN — DILTIAZEM HYDROCHLORIDE 30 MG: 30 TABLET, FILM COATED ORAL at 11:58

## 2017-11-22 RX ADMIN — METOPROLOL TARTRATE 25 MG: 25 TABLET ORAL at 21:51

## 2017-11-22 RX ADMIN — DIVALPROEX SODIUM 500 MG: 500 TABLET, EXTENDED RELEASE ORAL at 21:50

## 2017-11-22 RX ADMIN — METOPROLOL TARTRATE 25 MG: 25 TABLET ORAL at 09:04

## 2017-11-22 NOTE — PROGRESS NOTES
Problem: Falls - Risk of  Goal: *Absence of Falls  Document Katie Fall Risk and appropriate interventions in the flowsheet.    Outcome: Progressing Towards Goal  Fall Risk Interventions:  Mobility Interventions: Bed/chair exit alarm         Medication Interventions: Bed/chair exit alarm    Elimination Interventions: Call light in reach    History of Falls Interventions: Bed/chair exit alarm

## 2017-11-22 NOTE — PROGRESS NOTES
[facility] [address], [place]   [phone]                                   Hospitalist Service  Progress Note    Daily Progress Note: 11/22/2017    Assessment/Plan:   75 y/o F patient presented to the hospital after she was found on the floor. She stated that she tried to get off her bed and because her legs were so weak she slid off her bed to the floor and then she was unable to stand up. However, her son stated that en route to the hospital she had right sided gaze , neglecting the left side. He stated since his mother had brain surgery she has had seizure once in a year with a similar presentation. However, there was no reported tonic-clonic activity witnessed by EMS or at the ER. Currently the patient complains that her lower extremities are weak, more on the left side than the right side. Upon my examination it came to my attention that patient negleting her left leg, and when I am asking to raise her left leg she raises her left arm and sometimes her right leg.     1 Hx of CVA with LLE weakness at baseline - MRI of the brain (11/20) with no evidence of acute findings still the patient report that her left leg is significantly weaker than the baseline . She was able to ambulate  With walker /cane but now  She can't raise his left leg spoke with Dr Carmela Ladd no further work up is necessary. Continue ASA   PT / OT consulted and  for SNIF placement       2. History of seizure. Continue Keppra and Depakote. Neurology   consult. 3. Hypotension POA, in known HTN - resume metoprolol, hold Lasix  To prevent low BP's  4. History of parkinsonism. Continue levodopa/carbidopa. 5. History of hyperlipidemia, Elevated CK levels - on ivf for hydration, likely from fall, Continue Zocor. 6. History of GERD. On PPI. 7. as per my discussion with her son is FULL CODE and he is the POA.    8. L knee & ankle pain s/p fall - L Knee & L Hip Xray neg on 17. L ankle & hip Xray. 9. UTI with Hematuria - Start on po Levaquin 250mg po qday on 17 for 7 days to end on 17, po pyridium bid prn dysuria. Renal USS - Normal renal size and cortical echogenicity; No hydronephrosis; Small left renal cyst.  10. Hypokalemia - replace po. 11. DVT prophylaxis. Lovenox 40 mg subcutaneously. Dispo - PT / OT consulted, Don has auth NANCIE placement on 17, CM to help with dc plannign needs. Son Keith Alonzo [phone], updated on 17 of the plan of care that pt may be denied NANCIE due to unability to participate for [facility]. PTA [facility] was in indep living at [facility]. Subjective:   Feels better left leg weakness, per RN pt has hematuria.     Review of Systems:       Objective:   Physical examination  Visit Vitals    /68 (BP 1 Location: Right arm, BP Patient Position: Sitting)    Pulse 78    Temp 98.5 °F (36.9 °C)    Resp 16    Ht 5' 4\" (1.626 m)    Wt 76.9 kg (169 lb 8.5 oz)    SpO2 98%    Breastfeeding No    BMI 29.1 kg/m2      Temp (24hrs), Av.2 °F (36.8 °C), Min:97.3 °F (36.3 °C), Max:98.6 °F (37 °C)         O2 Device: Room air  Patient Vitals for the past 24 hrs:   Temp Pulse Resp BP SpO2   17 1421 98.5 °F (36.9 °C) 78 16 111/68 98 %   17 0913 97.3 °F (36.3 °C) 91 16 113/71 96 %   17 0636 - 93 - 106/67 -   17 0415 98.4 °F (36.9 °C) 81 16 106/67 94 %   17 2131 98.6 °F (37 °C) 92 19 100/67 96 %       Intake/Output Summary (Last 24 hours) at 11/22/17 1900  Last data filed at 17 1207   Gross per 24 hour   Intake              400 ml   Output                0 ml   Net              400 ml     Last shift:    701 - 1900  In: 400 [P.O.:400]  Out: -   Last 3 shifts:    1901 - 700  In: 360 [P.O.:360]  Out: -     General:   Alert, cooperative, no acute distress   Head:   Atraumatic   Eyes:   Conjunctivae clear   ENT:  Oral mucosa normal   Neck:  Supple, trachea midline, no adenopathy   No JVD   Back:    No CVA tenderness    Chest wall:    No tenderness or deformities    Lungs:   Clear to auscultation bilaterally    Heart:   Regular rhythm, no murmur   Abdomen:    Soft, non-tender   No masses or organomegaly    Extremities:  No edema or DVT signs   Pulses:  Symmetric all extremities   Skin:  Warm and dry    No rashes or lesions   Neurologic:  Oriented x 3, CN 2-12 intact   Left lower extremity  Weakness     Psychiatric:    Urinary catheter:  none     Data Review:       No results for input(s): WBC, HGB, HCT, PLT, HGBEXT, HCTEXT, PLTEXT, HGBEXT, HCTEXT, PLTEXT in the last 72 hours. Recent Labs      11/22/17   0411  11/21/17   0927   NA  141  140   K  4.1  3.4*   CL  107  105   CO2  26  26   GLU  92  171*   BUN  23*  14   CREA  0.73  0.85   CA  8.8  9.0   MG  2.4   --      No results for input(s): PH, PCO2, PO2, HCO3, FIO2 in the last 72 hours. Results for Jose Ferreira (MRN [id number]) as of 11/21/2017 07:43   Ref. Range 11/19/2017 17:27   CK Latest Ref Range: 26 - 192 U/L 1597 (H)   CK - MB Latest Ref Range: <3.6 NG/ML 33.3 (H)   CK-MB Index Latest Ref Range: 0 - 2.5   2.1   Troponin-I, Qt. Latest Ref Range: <0.05 ng/mL <0.04   Valproic acid Latest Ref Range: 50 - 100 ug/ml 93   Results for Jose Ferreira (MRN [id number]) as of 11/22/2017 19:00   Ref.  Range 11/19/2017 16:25 11/21/2017 15:51   Color Latest Units:   YELLOW/STRAW DARK YELLOW   Appearance Latest Ref Range: CLEAR   CLEAR CLOUDY (A)   Specific gravity Latest Ref Range: 1.003 - 1.030   1.014 1.019   pH (UA) Latest Ref Range: 5.0 - 8.0   7.0 7.0   Protein Latest Ref Range: NEG mg/dL NEGATIVE 30 (A)   Glucose Latest Ref Range: NEG mg/dL 100 (A) 500 (A)   Ketone Latest Ref Range: NEG mg/dL 15 (A) 15 (A)   Blood Latest Ref Range: NEG   TRACE (A) LARGE (A)   Bilirubin Latest Ref Range: NEG   NEGATIVE NEGATIVE   Urobilinogen Latest Ref Range: 0.2 - 1.0 EU/dL 0.2 1.0   Nitrites Latest Ref Range: NEG   NEGATIVE NEGATIVE   Leukocyte Esterase Latest Ref Range: NEG   NEGATIVE MODERATE (A)   Epithelial cells Latest Ref Range: FEW /lpf FEW FEW   WBC Latest Ref Range: 0 - 4 /hpf 0-4    RBC Latest Ref Range: 0 - 5 /hpf 0-5 >100 (H)   Bacteria Latest Ref Range: NEG /hpf NEGATIVE NEGATIVE   Hyaline cast Latest Ref Range: 0 - 5 /lpf 0-2      Lab Results   Component Value Date/Time    Glucose 92 11/22/2017 04:11 AM        All Micro Results     Procedure Component Value Units Date/Time    CULTURE, URINE [[id number]] Collected:  11/21/17 1551    Order Status:  Completed Specimen:  Urine from Cath Urine Updated:  11/22/17 1328    URINE CULTURE HOLD SAMPLE [[id number]] Collected:  11/21/17 1551    Order Status:  Completed Specimen:  Serum Updated:  11/21/17 1623     Urine culture hold         URINE ON HOLD IN MICROBIOLOGY DEPT FOR 3 DAYS    URINE CULTURE HOLD SAMPLE [[id number]] Collected:  11/19/17 1625    Order Status:  Completed Specimen:  Urine from Serum Updated:  11/19/17 1629     Urine culture hold         URINE ON HOLD IN MICROBIOLOGY DEPT FOR 3 DAYS        RADIOLOGY:  * EXAM:  XR FOOT LT MIN 3 V, XR ANKLE LT MIN 3 V     INDICATION:   Left ankle pain. Found down.     COMPARISON:  None.     FINDINGS:  Three views of the left foot and 3 additional views of the left ankle  demonstrate no fracture or other acute osseous or articular abnormality. There  are small calcaneal enthesophytes. The soft tissues are within normal limits.     IMPRESSION  IMPRESSION:  No acute abnormality. Signed Date/Time    Phone Pager   Juanis Caputo [place] BETSY 11/21/2017 18:35 [phone]     * EXAM:  XR FOOT LT MIN 3 V, XR ANKLE LT MIN 3 V     INDICATION:   Left ankle pain. Found down.     COMPARISON:  None.     FINDINGS:  Three views of the left foot and 3 additional views of the left ankle  demonstrate no fracture or other acute osseous or articular abnormality. There  are small calcaneal enthesophytes.  The soft tissues are within normal limits.     IMPRESSION  IMPRESSION:  No acute abnormality. Signed Date/Time    Phone Pager   Viviana Mantilla [place] BET[place] 11/21/2017 18:35 [phone]     * INDICATION: Hematuria     TECHNIQUE: Routine ultrasound images of the kidneys and retroperitoneum were  obtained.     FINDINGS: The right kidney measures 9.3 cm in length. The left kidney measures  9.8 cm in length. Both kidneys demonstrate normal cortical echogenicity. No  renal calculus is seen. There is no hydronephrosis. There is a 1.1 cm left renal  cyst. The abdominal aorta is normal in caliber. The common iliac artery origins  are normal. The urinary bladder demonstrates no filling defect. The inferior  vena cava is patent.     IMPRESSION  IMPRESSION: Normal renal size and cortical echogenicity. No hydronephrosis. Small left renal cyst.  Signed Date/Time    Phone Pager    Viviana Mantilla [place] BET[place] 11/21/2017 17:25 [phone]          * HISTORY:  Fall last night. Seizure history. Post meningioma resection question  recurrent seizure      COMPARISON:  November 19, 2017 and May 10, 2016     TECHNIQUE:  MR imaging of the brain was performed with sagittal T1, axial T1,  T2, FLAIR, GRE, DWI/ADC, coronal T2. Coronal FLAIR     FINDINGS:       Ventricles are enlarged compatible with the overall degree of volume loss. There is encephalomalacia in the bilateral frontal lobes, postsurgical in  nature, unchanged. There is volume loss in the anterior temporal lobes  bilaterally, unchanged. There is no mass effect or midline shift. . . There is no  acute infarction. There is no acute or chronic intracranial hemorrhage. The  major intracranial vascular flow-voids are patent. Marrow signal is normal.  Patient is post frontal craniotomy.     IMPRESSION  IMPRESSION:  1. No change in encephalomalacia and postoperative changes to the anterior  cranial fossa. No recurrent mass lesion  2.  Otherwise no acute intracranial abnormality  Signed Date/Time    Phone Pager     Chelo Daily 11/20/2017 08:14 [phone]     * Indication:  Possible seizure / Fall      Comparison: 5/9/2016     Findings: 5 mm axial images were obtained from the skull base through the  vertex.      CT dose reduction was achieved through the use of a standardized protocol  tailored for this examination and automatic exposure control for dose  modulation.     The ventricles and cortical sulci are prominent, compatible with age related  volume loss. There are chronic postsurgical changes with bilateral inferior  frontal lobe encephalomalacia. There is no evidence of intracranial hemorrhage,  mass, mass effect, or acute infarct. There is periventricular white matter  disease. No extra-axial fluid collections are seen. The visualized paranasal  sinuses and mastoid air cells are clear. The orbital structures are  unremarkable. No osseous abnormalities are seen.      IMPRESSION  Impression:   1. No evidence of acute infarct or intracranial hemorrhage. 2. Chronic bilateral frontal lobe encephalomalacia. Signed Date/Time    Phone Pager     Della Oquendo S 11/19/2017 18:04 [phone]     * INDICATION:  weakness      FINDINGS: Single AP portable view of the chest obtained at 1702 demonstrates a  normal cardiomediastinal silhouette. The lungs are hypoinspiratory and there is  left basilar atelectasis. No osseous abnormalities are seen.     IMPRESSION  IMPRESSION: Left basilar atelectasis. Signed Date/Time    Phone Pager   VANESSAkike MENDEZ, [address] 11/19/2017 17:16 [phone]     * EXAM:  XR HIP LT W OR WO PELV 2-3 VWS     INDICATION:   Trauma. Slid off bed today and landed on left hip with pain     COMPARISON: None.     FINDINGS: An AP view of the pelvis and a frogleg lateral view of the left hip  demonstrate no fracture, dislocation or other acute abnormality.     IMPRESSION  IMPRESSION:  No acute abnormality.   Signed Date/Time    Phone Pager     Chelo Daily 11/19/2017 17:16 [phone]     * EXAM:  XR KNEE LT 3 V     INDICATION:   Trauma. Gaston Buenrostro out of bed with left knee pain     COMPARISON: None.     FINDINGS: Three views of the left knee demonstrate no fracture or malalignment. There are degenerative changes of the medial and patellofemoral compartments. Bones are osteopenic. There is no effusion.     IMPRESSION  IMPRESSION:  No acute abnormality. Signed Date/Time    Phone Pager     Royer Mendez 11/19/2017 17:17 [phone]     Medications reviewed  No current facility-administered medications for this encounter. No current outpatient prescriptions on file.      Facility-Administered Medications Ordered in Other Encounters   Medication Dose Route Frequency    acetaminophen (TYLENOL) tablet 650 mg  650 mg Oral Q3H PRN    aluminum-magnesium hydroxide (MAALOX) oral suspension 30 mL  30 mL Oral Q4H PRN    ondansetron (ZOFRAN ODT) tablet 4 mg  4 mg Oral Q6H PRN    enoxaparin (LOVENOX) injection 40 mg  40 mg SubCUTAneous Q24H    senna-docusate (PERICOLACE) 8.6-50 mg per tablet 1 Tab  1 Tab Oral QHS    polyethylene glycol (MIRALAX) packet 17 g  17 g Oral DAILY PRN    magnesium hydroxide (MILK OF MAGNESIA) 400 mg/5 mL oral suspension 30 mL  30 mL Oral DAILY PRN    [START ON 11/23/2017] calcium carbonate (OS-MOIZ) tablet 500 mg [elemental]  500 mg Oral ACB    dilTIAZem (CARDIZEM) IR tablet 30 mg  30 mg Oral TIDAC    divalproex ER (DEPAKOTE ER) 24 hour tablet 500 mg  500 mg Oral Q12H    levETIRAcetam (KEPPRA) tablet 1,000 mg  1,000 mg Oral BID    [START ON 11/23/2017] levoFLOXacin (LEVAQUIN) tablet 250 mg  250 mg Oral Q24H    lidocaine (LIDODERM) 5 % patch 1 Patch  1 Patch TransDERmal DAILY    [START ON 11/23/2017] loratadine (CLARITIN) tablet 10 mg  10 mg Oral DAILY    metoprolol tartrate (LOPRESSOR) tablet 25 mg  25 mg Oral BID    simvastatin (ZOCOR) tablet 10 mg  10 mg Oral QHS    carbidopa-levodopa (SINEMET)  mg per tablet 1 Tab  1 Tab Oral TID         Signed:   Matty Stahl MD   Internist / [facility]

## 2017-11-22 NOTE — PROGRESS NOTES
Problem: Mobility Impaired (Adult and Pediatric)  Goal: *Acute Goals and Plan of Care (Insert Text)  Physical Therapy Goals  Initiated 11/20/2017  1. Patient will move from supine to sit and sit to supine  in bed with contact guard assist within 7 day(s). 2.  Patient will transfer from bed to chair and chair to bed with contact guard assist using the least restrictive device within 7 day(s). 3.  Patient will perform sit to stand with contact guard assist within 7 day(s). 4.  Patient will ambulate with contact guard assist for 100 feet with the least restrictive device within 7 day(s). physical Therapy TREATMENT  Patient: Neda Reyes (73 y.o. female)  Date: 11/22/2017  Diagnosis: Seizure ([facility] [address]  Seizure ([facility] [address]  Weakness due to cerebrovascular accident [facility])  Weakness due to cerebrovascular accident [facility]) <principal problem not specified>       Precautions: Fall    ASSESSMENT:  Chart reviewed and RN cleared patient for mobility. Patient was agreeable to participate and was received in bed. Patient continues to require assistance for bed mobility and transfers (described below) although has shown significant improvement with ambulation quality and endurance. Patient ended session in chair with all needs placed within reach and RN notified of patient's status and attending to patient. PT recommending inpatient rehab as PTA patient lived independently and currently is below functional baseline. Patient would benefit from and can tolerate 3 hours of therapy as she is motivated and great rehab potential.      Progression toward goals:  [x]    Improving appropriately and progressing toward goals  []    Improving slowly and progressing toward goals  []    Not making progress toward goals and plan of care will be adjusted     PLAN:  Patient continues to benefit from skilled intervention to address the above impairments. Continue treatment per established plan of care.   Discharge Recommendations:  Inpatient Rehab  Further Equipment Recommendations for Discharge: Owns rollator     SUBJECTIVE:   Patient stated I know I dont want to go back and get weaker. ..and apparently therapy only comes around on their own time and I am not a morning person.     OBJECTIVE DATA SUMMARY:   Critical Behavior:  Neurologic State: Alert  Orientation Level: Oriented to person, Oriented to place  Cognition: Impaired decision making     Functional Mobility Training:  Bed Mobility:  Rolling: Assist x1;Minimum assistance  Supine to Sit: Assist x1; Moderate assistance; Additional time              Transfers:  Sit to Stand: Assist x1;Minimum assistance  Stand to Sit: Assist x1;Minimum assistance                             Balance:  Sitting: Intact  Standing: Impaired; With support  Standing - Static: Good  Standing - Dynamic : Fair  Ambulation/Gait Training:  Distance (ft): 100 Feet (ft)  Assistive Device: Gait belt;Walker, rolling  Ambulation - Level of Assistance: Contact guard assistance        Gait Abnormalities: Decreased step clearance; Path deviations        Base of Support: Widened     Speed/Maryellen: Slow;Shuffled  Step Length: Right shortened;Left shortened                    Stairs:            Neuro Re-Education:    Therapeutic Exercises:     Pain:  Pain Scale 1: Numeric (0 - 10)  Pain Intensity 1: 2  Pain Location 1: Leg  Pain Orientation 1: Left  Pain Description 1: Aching;Constant  Pain Intervention(s) 1: Rest  Activity Tolerance:   Good, patient continues to show improvement  Please refer to the flowsheet for vital signs taken during this treatment.   After treatment:   [x]    Patient left in no apparent distress sitting up in chair  []    Patient left in no apparent distress in bed  [x]    Call bell left within reach  [x]    Nursing notified  []    Caregiver present  []    Bed alarm activated    COMMUNICATION/COLLABORATION:   The patients plan of care was discussed with: Registered Nurse    Mann Isabel PT, DPT   Time Calculation: 26 mins

## 2017-11-22 NOTE — PROGRESS NOTES
Spiritual Care Partner Volunteer visited patient in Rm 207 on 11/22/17.   Documented by:  Chaplain Adkins MDiv, MS, [employer]  [address] [place] [address] ([phone])

## 2017-11-22 NOTE — PROGRESS NOTES
Briefly reviewed chart. Per consult with Roxana Beal from [facility], patient has been accepted and they have received insurance authorization. However, would like current bleeding addressed prior to transfer. CRM following for completion of d/c plan. Sae [place] LCSW, [facility]  RN reports Roxana Beal from [facility] has called and stated they can admit patient. Patient does not require ambulance. Talked with son who states he will tranport mother. Will address EMTALA and place envelope on chart.   Sae [place] LCSW, CCM

## 2017-11-22 NOTE — PROGRESS NOTES
Bedside shift change report given to Robi Rodrigues RN (oncoming nurse) by Abdiel Cee RN (offgoing nurse). Report included the following information SBAR, Kardex and MAR.

## 2017-11-22 NOTE — PROGRESS NOTES
Bedside shift change report given to Nallely Pina (oncoming nurse) by Grayson Gastelum (offgoing nurse). Report included the following information SBAR and Kardex.

## 2017-11-22 NOTE — PROGRESS NOTES
Bedside shift change report given to Abdiel Cee (oncoming nurse) by Kyrie Bowser (offgoing nurse). Report included the following information SBAR and Kardex.

## 2017-11-23 LAB
25(OH)D3 SERPL-MCNC: 31.8 NG/ML (ref 30–100)
ALBUMIN SERPL-MCNC: 2.4 G/DL (ref 3.5–5)
ALBUMIN/GLOB SERPL: 0.6 {RATIO} (ref 1.1–2.2)
ALP SERPL-CCNC: 56 U/L (ref 45–117)
ALT SERPL-CCNC: 7 U/L (ref 12–78)
ANION GAP SERPL CALC-SCNC: 7 MMOL/L (ref 5–15)
APPEARANCE UR: ABNORMAL
AST SERPL-CCNC: 27 U/L (ref 15–37)
BACTERIA URNS QL MICRO: ABNORMAL /HPF
BILIRUB SERPL-MCNC: 0.3 MG/DL (ref 0.2–1)
BILIRUB UR QL: NEGATIVE
BUN SERPL-MCNC: 22 MG/DL (ref 6–20)
BUN/CREAT SERPL: 32 (ref 12–20)
CALCIUM SERPL-MCNC: 8.9 MG/DL (ref 8.5–10.1)
CHLORIDE SERPL-SCNC: 104 MMOL/L (ref 97–108)
CO2 SERPL-SCNC: 27 MMOL/L (ref 21–32)
COLOR UR: ABNORMAL
CREAT SERPL-MCNC: 0.68 MG/DL (ref 0.55–1.02)
EPITH CASTS URNS QL MICRO: ABNORMAL /LPF
ERYTHROCYTE [DISTWIDTH] IN BLOOD BY AUTOMATED COUNT: 13.4 % (ref 11.5–14.5)
GLOBULIN SER CALC-MCNC: 3.9 G/DL (ref 2–4)
GLUCOSE SERPL-MCNC: 106 MG/DL (ref 65–100)
GLUCOSE UR STRIP.AUTO-MCNC: NEGATIVE MG/DL
HCT VFR BLD AUTO: 34 % (ref 35–47)
HGB BLD-MCNC: 11.5 G/DL (ref 11.5–16)
HGB UR QL STRIP: ABNORMAL
KETONES UR QL STRIP.AUTO: ABNORMAL MG/DL
LEUKOCYTE ESTERASE UR QL STRIP.AUTO: ABNORMAL
MAGNESIUM SERPL-MCNC: 2.2 MG/DL (ref 1.6–2.4)
MCH RBC QN AUTO: 32.6 PG (ref 26–34)
MCHC RBC AUTO-ENTMCNC: 33.8 G/DL (ref 30–36.5)
MCV RBC AUTO: 96.3 FL (ref 80–99)
NITRITE UR QL STRIP.AUTO: NEGATIVE
PH UR STRIP: 6.5 [PH] (ref 5–8)
PLATELET # BLD AUTO: 197 K/UL (ref 150–400)
POTASSIUM SERPL-SCNC: 4 MMOL/L (ref 3.5–5.1)
PROT SERPL-MCNC: 6.3 G/DL (ref 6.4–8.2)
PROT UR STRIP-MCNC: 30 MG/DL
RBC # BLD AUTO: 3.53 M/UL (ref 3.8–5.2)
RBC #/AREA URNS HPF: ABNORMAL /HPF (ref 0–5)
SODIUM SERPL-SCNC: 138 MMOL/L (ref 136–145)
SP GR UR REFRACTOMETRY: 1.02 (ref 1–1.03)
UROBILINOGEN UR QL STRIP.AUTO: 0.2 EU/DL (ref 0.2–1)
WBC # BLD AUTO: 10.3 K/UL (ref 3.6–11)
WBC URNS QL MICRO: ABNORMAL /HPF (ref 0–4)

## 2017-11-23 PROCEDURE — 85027 COMPLETE CBC AUTOMATED: CPT | Performed by: PHYSICAL MEDICINE & REHABILITATION

## 2017-11-23 PROCEDURE — 36415 COLL VENOUS BLD VENIPUNCTURE: CPT | Performed by: PHYSICAL MEDICINE & REHABILITATION

## 2017-11-23 PROCEDURE — 87086 URINE CULTURE/COLONY COUNT: CPT | Performed by: PHYSICAL MEDICINE & REHABILITATION

## 2017-11-23 PROCEDURE — 83735 ASSAY OF MAGNESIUM: CPT | Performed by: PHYSICAL MEDICINE & REHABILITATION

## 2017-11-23 PROCEDURE — 82306 VITAMIN D 25 HYDROXY: CPT | Performed by: PHYSICAL MEDICINE & REHABILITATION

## 2017-11-23 PROCEDURE — 81001 URINALYSIS AUTO W/SCOPE: CPT | Performed by: PHYSICAL MEDICINE & REHABILITATION

## 2017-11-23 PROCEDURE — 74011250636 HC RX REV CODE- 250/636: Performed by: PHYSICAL MEDICINE & REHABILITATION

## 2017-11-23 PROCEDURE — 87077 CULTURE AEROBIC IDENTIFY: CPT | Performed by: PHYSICAL MEDICINE & REHABILITATION

## 2017-11-23 PROCEDURE — 74011250637 HC RX REV CODE- 250/637: Performed by: PHYSICAL MEDICINE & REHABILITATION

## 2017-11-23 PROCEDURE — 87186 SC STD MICRODIL/AGAR DIL: CPT | Performed by: PHYSICAL MEDICINE & REHABILITATION

## 2017-11-23 PROCEDURE — 80053 COMPREHEN METABOLIC PANEL: CPT | Performed by: PHYSICAL MEDICINE & REHABILITATION

## 2017-11-23 RX ORDER — TRAZODONE HYDROCHLORIDE 50 MG/1
50 TABLET ORAL
Status: DISCONTINUED | OUTPATIENT
Start: 2017-11-23 | End: 2017-12-01 | Stop reason: HOSPADM

## 2017-11-23 RX ADMIN — LEVETIRACETAM 1000 MG: 500 TABLET ORAL at 09:05

## 2017-11-23 RX ADMIN — SIMVASTATIN 10 MG: 5 TABLET, FILM COATED ORAL at 20:36

## 2017-11-23 RX ADMIN — DILTIAZEM HYDROCHLORIDE 30 MG: 30 TABLET, FILM COATED ORAL at 13:00

## 2017-11-23 RX ADMIN — DIVALPROEX SODIUM 500 MG: 500 TABLET, EXTENDED RELEASE ORAL at 20:36

## 2017-11-23 RX ADMIN — CARBIDOPA AND LEVODOPA 1 TABLET: 25; 100 TABLET ORAL at 05:44

## 2017-11-23 RX ADMIN — ENOXAPARIN SODIUM 40 MG: 40 INJECTION SUBCUTANEOUS at 20:37

## 2017-11-23 RX ADMIN — METOPROLOL TARTRATE 25 MG: 25 TABLET ORAL at 20:35

## 2017-11-23 RX ADMIN — CARBIDOPA AND LEVODOPA 1 TABLET: 25; 100 TABLET ORAL at 13:00

## 2017-11-23 RX ADMIN — DILTIAZEM HYDROCHLORIDE 30 MG: 30 TABLET, FILM COATED ORAL at 09:05

## 2017-11-23 RX ADMIN — DILTIAZEM HYDROCHLORIDE 30 MG: 30 TABLET, FILM COATED ORAL at 16:52

## 2017-11-23 RX ADMIN — CARBIDOPA AND LEVODOPA 1 TABLET: 25; 100 TABLET ORAL at 20:36

## 2017-11-23 RX ADMIN — CALCIUM 500 MG: 500 TABLET ORAL at 09:06

## 2017-11-23 RX ADMIN — POLYETHYLENE GLYCOL 3350 17 G: 17 POWDER, FOR SOLUTION ORAL at 09:06

## 2017-11-23 RX ADMIN — METOPROLOL TARTRATE 25 MG: 25 TABLET ORAL at 09:05

## 2017-11-23 RX ADMIN — LEVETIRACETAM 1000 MG: 500 TABLET ORAL at 20:37

## 2017-11-23 RX ADMIN — DIVALPROEX SODIUM 500 MG: 500 TABLET, EXTENDED RELEASE ORAL at 09:05

## 2017-11-23 RX ADMIN — LEVOFLOXACIN 250 MG: 250 TABLET, FILM COATED ORAL at 09:05

## 2017-11-23 RX ADMIN — TRAZODONE HYDROCHLORIDE 50 MG: 50 TABLET ORAL at 20:37

## 2017-11-23 RX ADMIN — LORATADINE 10 MG: 10 TABLET ORAL at 09:05

## 2017-11-23 NOTE — DISCHARGE SUMMARY
[address] [place] [address], [facility]  Lyla Wang, 120[address] [facility]  [phone]    Discharge Summary     PATIENT ID: Edgardo Robledo  MRN: [id number]   YOB: 1940    DATE OF ADMISSION: 11/19/2017  3:41 PM    DATE OF DISCHARGE: 11/22/2017  PRIMARY CARE PROVIDER: Truong Bentley MD       DISCHARGING PHYSICIAN: Lyla Wang MD    To contact this individual call [phone]. CONSULTATIONS: IP CONSULT TO NEUROLOGY    PROCEDURES/SURGERIES: * No surgery found *    ADMITTING [address] [place] [address] COURSE:   The patient is a 70-year-old female with a known history of seizure   disorder, presents with possible seizure-like activity with numbness of   the left arm, some jerky movement and weakness of the left side. The   patient's valproic acid labs will be checked. The antiepileptic medication   labs will be checked and neurology consult has been taken. Will order   the MRI and the MRA scan. Possibility of a transient ischemic attack is   present which will also be monitored. 2. Parkinson disease. Will continue with the Sinemet for now. 3. Dyslipidemia. Continue with the Zocor.      DISCHARGE DIAGNOSES / PLAN:      75 y/o F patient presented to the hospital after she was found on the floor. She stated that she tried to get off her bed and because her legs were so weak she slid off her bed to the floor and then she was unable to stand up. However, her son stated that en route to the hospital she had right sided gaze , neglecting the left side. He stated since his mother had brain surgery she has had seizure once in a year with a similar presentation. However, there was no reported tonic-clonic activity witnessed by EMS or at the ER. Currently the patient complains that her lower extremities are weak, more on the left side than the right side.  Upon my examination it came to my attention that patient negleting her left leg, and when I am asking to raise her left leg she raises her left arm and sometimes her right leg.      1 Hx of CVA with LLE weakness at baseline - MRI of the brain (11/20) with no evidence of acute findings still the patient report that her left leg is significantly weaker than the baseline . She was able to ambulate  With walker /cane but now  She can't raise his left leg spoke with Dr Cheko Mitchell no further work up is necessary. Continue ASA   PT / OT consulted and  for SNIF placement        2. History of seizure. Continue Keppra and Depakote. Neurology   consult. 3. Hypotension POA, in known HTN - resume metoprolol, hold Lasix  To prevent low BP's  4. History of parkinsonism. Continue levodopa/carbidopa. 5. History of hyperlipidemia, Elevated CK levels - on ivf for hydration, likely from fall, Continue Zocor. 6. History of GERD. On PPI. 7. as per my discussion with her son is FULL CODE and he is the POA. 8. L knee & ankle pain s/p fall - L Knee & L Hip Xray neg on 11/19/17. L ankle & hip Xray. 9. UTI with Hematuria - Start on po Levaquin 250mg po qday on 11/22/17 for 7 days to end on 11/29/17, po pyridium bid prn dysuria. Renal USS - Normal renal size and cortical echogenicity; No hydronephrosis; Small left renal cyst.  10. Hypokalemia - replace po. 11. DVT prophylaxis. Lovenox 40 mg subcutaneously. Dispo - PT / OT consulted, Don has auth NANCIE placement on 11/22/17, CM to help with dc plannign needs. Son Eduin Ang [phone], updated on 11/21/17 of the plan of care that pt may be denied NANCIE due to unability to participate for [facility]. [facility] in ind[facility]. [place]is Lei specific discharge instructions:   No unfinished business,   no new oxygen needs,   no new anti-coagulation needs. New Rx changes - po Levaquin 250mg po qday on 11/22/17 for 7 days to end on 11/29/17, po pyridium bid prn dysuria, Rx Carbi-dopa tid.        PENDING TEST RESULTS:   At the time of discharge the following test results are still pending: None    FOLLOW UP APPOINTMENTS:    Follow-up Information     Follow up With Details Comments Contact Info    Phys Other, MD   Patient can only remember the practice name and not the physician             ADDITIONAL CARE RECOMMENDATIONS: PT / OT / [facility]    DIET: Cardiac Diet    ACTIVITY: Activity as tolerated    WOUND CARE: None    EQUIPMENT needed: as per rehab PT/OT. DISCHARGE MEDICATIONS:  Discharge Medication List as of 11/22/2017  1:49 PM      START taking these medications    Details   levoFLOXacin (LEVAQUIN) 250 mg tablet Take 1 Tab by mouth every twenty-four (24) hours. , Print, Disp-7 Tab, R-0      lidocaine 5 % topical cream Apply  to affected area two (2) times daily as needed for Pain (Left ankle & foot). , Print, Disp-1 Tube, R-0      phenazopyridine (PYRIDIUM) 100 mg tablet Take 1 Tab by mouth three (3) times daily (after meals) for 3 days. Dysuria / hematuria, Print, Disp-9 Tab, R-0         CONTINUE these medications which have CHANGED    Details   carbidopa-levodopa (SINEMET)  mg per tablet Take 1 Tab by mouth three (3) times daily. , Print, Disp-90 Tab, R-0         CONTINUE these medications which have NOT CHANGED    Details   furosemide (LASIX) 20 mg tablet Take 20 mg by mouth daily as needed., Historical Med      calcium carbonate (OS-MOIZ) 500 mg calcium (1,250 mg) tablet Take 1 Tab by mouth Daily (before breakfast). , Historical Med      divalproex ER (DEPAKOTE ER) 250 mg ER tablet Take 500 mg by mouth every twelve (12) hours. , Historical Med      levETIRAcetam 1,000 mg tablet Take 1,000 mg by mouth two (2) times a day., Historical Med      metoprolol tartrate (LOPRESSOR) 25 mg tablet Take 25 mg by mouth two (2) times a day., Historical Med      simvastatin (ZOCOR) 10 mg tablet Take 10 mg by mouth nightly., Historical Med      loratadine (CLARITIN) 10 mg tablet Take 10 mg by mouth daily. , Historical Med aspirin delayed-release 81 mg tablet Take 81 mg by mouth daily. , Historical Med      multivitamin (ONE A DAY) tablet Take 1 Tab by mouth daily. , Historical Med               NOTIFY YOUR PHYSICIAN FOR ANY OF THE FOLLOWING:   Fever over 101 degrees for 24 hours. Chest pain, shortness of breath, fever, chills, nausea, vomiting, diarrhea, change in mentation, falling, weakness, bleeding. Severe pain or pain not relieved by medications. Or, any other signs or symptoms that you may have questions about.     DISPOSITION:    Home With:   OT  PT  HH  RN      x Long term SNF/Inpatient Rehab    Independent/assisted living    Hospice    Other:       PATIENT CONDITION AT DISCHARGE:     Functional status    Poor    x Deconditioned     Independent      Cognition     Lucid     Forgetful     Dementia      Catheters/lines (plus indication)    Gomez     PICC     PEG    x None      Code status    x Full code     DNR      PHYSICAL EXAMINATION AT DISCHARGE:  Physical examination       Visit Vitals    /68 (BP 1 Location: Right arm, BP Patient Position: Sitting)    Pulse 78    Temp 98.5 °F (36.9 °C)    Resp 16    Ht 5' 4\" (1.626 m)    Wt 76.9 kg (169 lb 8.5 oz)    SpO2 98%    Breastfeeding No    BMI 29.1 kg/m2      Temp (24hrs), Av.2 °F (36.8 °C), Min:97.3 °F (36.3 °C), Max:98.6 °F (37 °C)          O2 Device: Room air  Patient Vitals for the past 24 hrs:    Temp Pulse Resp BP SpO2   17 1421 98.5 °F (36.9 °C) 78 16 111/68 98 %   17 0913 97.3 °F (36.3 °C) 91 16 113/71 96 %   17 0636 - 93 - 106/ -   17 0415 98.4 °F (36.9 °C) 81 16 106/67 94 %   17 2131 98.6 °F (37 °C) 92 19 100/67 96 %         Intake/Output Summary (Last 24 hours) at 17  Last data filed at 17 1207    Gross per 24 hour   Intake              400 ml   Output                0 ml   Net              400 ml      Last shift:    701 - 1900  In: 400 [P.O.:400]  Out: -   Last 3 shifts:    1901 - 11/22 0700  In: 360 [P.O.:360]  Out: -      General:   Alert, cooperative, no acute distress   Head:   Atraumatic   Eyes:   Conjunctivae clear   ENT:  Oral mucosa normal   Neck:  Supple, trachea midline, no adenopathy   No JVD   Back:    No CVA tenderness    Chest wall:    No tenderness or deformities    Lungs:   Clear to auscultation bilaterally    Heart:   Regular rhythm, no murmur   Abdomen:    Soft, non-tender   No masses or organomegaly    Extremities:  No edema or DVT signs   Pulses:  Symmetric all extremities   Skin:  Warm and dry    No rashes or lesions   Neurologic:  Oriented x 3, CN 2-12 intact   Left lower extremity  Weakness     Psychiatric:     Urinary catheter:  none      Data Review:         No results for input(s): WBC, HGB, HCT, PLT, HGBEXT, HCTEXT, PLTEXT, HGBEXT, HCTEXT, PLTEXT in the last 72 hours. Recent Labs       11/22/17   0411  11/21/17   0927   NA  141  140   K  4.1  3.4*   CL  107  105   CO2  26  26   GLU  92  171*   BUN  23*  14   CREA  0.73  0.85   CA  8.8  9.0   MG  2.4   --       No results for input(s): PH, PCO2, PO2, HCO3, FIO2 in the last 72 hours. Results for Annalise Saleh (MRN [id number]) as of 11/21/2017 07:43    Ref. Range 11/19/2017 17:27   CK Latest Ref Range: 26 - 192 U/L 1597 (H)   CK - MB Latest Ref Range: <3.6 NG/ML 33.3 (H)   CK-MB Index Latest Ref Range: 0 - 2.5   2.1   Troponin-I, Qt. Latest Ref Range: <0.05 ng/mL <0.04   Valproic acid Latest Ref Range: 50 - 100 ug/ml 93   Results for Annalise Saleh (MRN [id number]) as of 11/22/2017 19:00    Ref.  Range 11/19/2017 16:25 11/21/2017 15:51   Color Latest Units:   YELLOW/STRAW DARK YELLOW   Appearance Latest Ref Range: CLEAR   CLEAR CLOUDY (A)   Specific gravity Latest Ref Range: 1.003 - 1.030   1.014 1.019   pH (UA) Latest Ref Range: 5.0 - 8.0   7.0 7.0   Protein Latest Ref Range: NEG mg/dL NEGATIVE 30 (A)   Glucose Latest Ref Range: NEG mg/dL 100 (A) 500 (A)   Ketone Latest Ref Range: NEG mg/dL 15 (A) 15 (A)   Blood Latest Ref Range: NEG   TRACE (A) LARGE (A)   Bilirubin Latest Ref Range: NEG   NEGATIVE NEGATIVE   Urobilinogen Latest Ref Range: 0.2 - 1.0 EU/dL 0.2 1.0   Nitrites Latest Ref Range: NEG   NEGATIVE NEGATIVE   Leukocyte Esterase Latest Ref Range: NEG   NEGATIVE MODERATE (A)   Epithelial cells Latest Ref Range: FEW /lpf FEW FEW   WBC Latest Ref Range: 0 - 4 /hpf 0-4    RBC Latest Ref Range: 0 - 5 /hpf 0-5 >100 (H)   Bacteria Latest Ref Range: NEG /hpf NEGATIVE NEGATIVE   Hyaline cast Latest Ref Range: 0 - 5 /lpf 0-2              Lab Results   Component Value Date/Time     Glucose 92 11/22/2017 04:11 AM         All Micro Results     Procedure Component Value Units Date/Time             CULTURE, URINE [[id number]] Collected:  11/21/17 1551     Order Status:  Completed Specimen:  Urine from Cath Urine Updated:  11/22/17 1328     URINE CULTURE HOLD SAMPLE [[id number]] Collected:  11/21/17 1551     Order Status:  Completed Specimen:  Serum Updated:  11/21/17 1623       Urine culture hold              URINE ON HOLD IN MICROBIOLOGY DEPT FOR 3 DAYS     URINE CULTURE HOLD SAMPLE [[id number]] Collected:  11/19/17 1625     Order Status:  Completed Specimen:  Urine from Serum Updated:  11/19/17 1629       Urine culture hold              URINE ON HOLD IN MICROBIOLOGY DEPT FOR 3 DAYS         RADIOLOGY:  * EXAM:  XR FOOT LT MIN 3 V, XR ANKLE LT MIN 3 V      INDICATION:   Left ankle pain.  Found down.      COMPARISON: [facility]  FINDINGS:  Three views of the left foot and 3 additional views of the left ankle  demonstrate no fracture or other acute osseous or articular abnormality. There  are small calcaneal enthesophytes. The soft tissues are within normal limits.      IMPRESSION  IMPRESSION:  No acute abnormality.   Signed Date/Time    Phone Pager   Martha Sandhu [place] BETSY 11/21/2017 18:35 [phone]      * EXAM:  XR FOOT LT MIN 3 V, XR ANKLE LT MIN 3 V      INDICATION:   Left ankle pain.  Found down.      COMPARISON: [facility]  FINDINGS:  Three views of the left foot and 3 additional views of the left ankle  demonstrate no fracture or other acute osseous or articular abnormality. There  are small calcaneal enthesophytes. The soft tissues are within normal limits.      IMPRESSION  IMPRESSION:  No acute abnormality. Signed Date/Time    Phone Pager   Reji Nieto 11/21/2017 18:35 [phone]      * INDICATION: Hematuria      TECHNIQUE: Routine ultrasound images of the kidneys and retroperitoneum were  obtained.      FINDINGS: The right kidney measures 9.3 cm in length. The left kidney measures  9.8 cm in length. Both kidneys demonstrate normal cortical echogenicity. No  renal calculus is seen. There is no hydronephrosis. There is a 1.1 cm left renal  cyst. The abdominal aorta is normal in caliber. The common iliac artery origins  are normal. The urinary bladder demonstrates no filling defect. The inferior  vena cava is patent.      IMPRESSION  IMPRESSION: Normal renal size and cortical echogenicity. No hydronephrosis. Small left renal cyst.  Signed Date/Time    Phone Pager Lb Powell, [address] [place] [address] 11/21/2017 17:25 [phone]             * HISTORY:  Fall last night. Seizure history. Post meningioma resection question  recurrent seizure       COMPARISON:  November 19, 2017 and May 10, 2016      TECHNIQUE: [facility] imaging of the brain was performed with sagittal T1, axial T1,  T2, FLAIR, GRE, DWI/ADC, coronal T2. Coronal FLAIR      FINDINGS:        Ventricles are enlarged compatible with the overall degree of volume loss. There is encephalomalacia in the bilateral frontal lobes, postsurgical in  nature, unchanged. There is volume loss in the anterior temporal lobes  bilaterally, unchanged. There is no mass effect or midline shift. . . There is no  acute infarction. There is no acute or chronic intracranial hemorrhage. The  major intracranial vascular flow-voids are patent.  Marrow signal is normal.  Patient is post frontal craniotomy.      IMPRESSION  IMPRESSION:  1.  No change in encephalomalacia and postoperative changes to the anterior  cranial fossa. No recurrent mass lesion  2. Otherwise no acute intracranial abnormality  Signed Date/Time    Phone Pager   Yuriy Ana 11/20/2017 08:14 [phone]      * Indication:  Possible seizure / Fall       Comparison: 5/9/2016      Findings: 5 mm axial images were obtained from the skull base through the  vertex.       CT dose reduction was achieved through the use of a standardized protocol  tailored for this examination and automatic exposure control for dose  modulation.      The ventricles and cortical sulci are prominent, compatible with age related  volume loss. There are chronic postsurgical changes with bilateral inferior  frontal lobe encephalomalacia. There is no evidence of intracranial hemorrhage,  mass, mass effect, or acute infarct. There is periventricular white matter  disease. No extra-axial fluid collections are seen. The visualized paranasal  sinuses and mastoid air cells are clear. The orbital structures are  unremarkable. No osseous abnormalities are seen.       IMPRESSION  Impression:   1. No evidence of acute infarct or intracranial hemorrhage. 2. Chronic bilateral frontal lobe encephalomalacia. Signed Date/Time    Phone Pager   Bharat Delcider, [address] 11/19/2017 18:04 [phone]      * INDICATION: Lian Apt       FINDINGS: Single AP portable view of the chest obtained at 1702 demonstrates a  normal cardiomediastinal silhouette. The lungs are hypoinspiratory and there is  left basilar atelectasis. No osseous abnormalities are seen.      IMPRESSION  IMPRESSION: Left basilar atelectasis.   Signed Date/Time    Phone Pager   Bharat Delcider, [address] 11/19/2017 17:16 [phone]      * EXAM:  XR HIP LT W OR WO PELV 2-3 VWS      INDICATION:   Trauma.  Slid off bed today and landed on left hip with pain      COMPARISON: None.      FINDINGS: An AP view of the pelvis and a frogleg lateral view of the left hip  demonstrate no fracture, dislocation or other acute abnormality.      IMPRESSION  IMPRESSION:  No acute abnormality. Signed Date/Time    Phone Pager   Linsey Walls 11/19/2017 17:16 [phone]      * EXAM:  XR KNEE LT 3 V      INDICATION:   Trauma.  Fell out of bed with left knee pain      COMPARISON: None.      FINDINGS: Three views of the left knee demonstrate no fracture or malalignment. There are degenerative changes of the medial and patellofemoral compartments. Bones are osteopenic.  There is no effusion.      IMPRESSION  IMPRESSION:  No acute abnormality.   Signed Date/Time    Phone Pager   Linsey Walls 11/19/2017 17:17 [phone]      Medications reviewed  No current facility-administered medications for this encounter.       No current outpatient prescriptions on file.             Facility-Administered Medications Ordered in Other Encounters   Medication Dose Route Frequency    acetaminophen (TYLENOL) tablet 650 mg  650 mg Oral Q3H PRN    aluminum-magnesium hydroxide (MAALOX) oral suspension 30 mL  30 mL Oral Q4H PRN    ondansetron (ZOFRAN ODT) tablet 4 mg  4 mg Oral Q6H PRN    enoxaparin (LOVENOX) injection 40 mg  40 mg SubCUTAneous Q24H    senna-docusate (PERICOLACE) 8.6-50 mg per tablet 1 Tab  1 Tab Oral QHS    polyethylene glycol (MIRALAX) packet 17 g  17 g Oral DAILY PRN    magnesium hydroxide (MILK OF MAGNESIA) 400 mg/5 mL oral suspension 30 mL  30 mL Oral DAILY PRN    [START ON 11/23/2017] calcium carbonate (OS-MOIZ) tablet 500 mg [elemental]  500 mg Oral ACB    dilTIAZem (CARDIZEM) IR tablet 30 mg  30 mg Oral TIDAC    divalproex ER (DEPAKOTE ER) 24 hour tablet 500 mg  500 mg Oral Q12H    levETIRAcetam (KEPPRA) tablet 1,000 mg  1,000 mg Oral BID    [START ON 11/23/2017] levoFLOXacin (LEVAQUIN) tablet 250 mg  250 mg Oral Q24H    lidocaine (LIDODERM) 5 % patch 1 Patch  1 Patch TransDERmal DAILY    [START ON 11/23/2017] loratadine (Naldo Delfino) tablet 10 mg  10 mg Oral DAILY    metoprolol tartrate (LOPRESSOR) tablet 25 mg  25 mg Oral BID    simvastatin (ZOCOR) tablet 10 mg  10 mg Oral QHS    carbidopa-levodopa (SINEMET)  mg per tablet 1 Tab  1 Tab Oral TID     CHRONIC MEDICAL DIAGNOSES:  Problem List as of 11/22/2017  Date Reviewed: 11/4/2016          Codes Class Noted - Resolved    Partial symptomatic epilepsy ([facility] ICD-10-CM: G40.109  ICD-9-CM: 345.90  11/20/2017 - Present        Weakness due to cerebrovascular accident [facility]) ICD-10-CM: I63.9, R53.1  ICD-9-CM: 434.91, 780.79  11/20/2017 - Present        Weakness of both legs ICD-10-CM: R29.898  ICD-9-CM: 729.89  11/19/2017 - Present        Seizures ([facility] ICD-10-CM: R56.9  ICD-9-CM: 780.39  5/9/2016 - Present              Greater than 35 minutes were spent with the patient on counseling and coordination of care    Signed:   Paige Wood MD  11/22/2017  7:06 PM

## 2017-11-24 LAB
BACTERIA SPEC CULT: ABNORMAL
BACTERIA SPEC CULT: ABNORMAL
CC UR VC: ABNORMAL
ERYTHROCYTE [DISTWIDTH] IN BLOOD BY AUTOMATED COUNT: 13.2 % (ref 11.5–14.5)
HCT VFR BLD AUTO: 31.8 % (ref 35–47)
HGB BLD-MCNC: 10.6 G/DL (ref 11.5–16)
MCH RBC QN AUTO: 32.3 PG (ref 26–34)
MCHC RBC AUTO-ENTMCNC: 33.3 G/DL (ref 30–36.5)
MCV RBC AUTO: 97 FL (ref 80–99)
PLATELET # BLD AUTO: 217 K/UL (ref 150–400)
RBC # BLD AUTO: 3.28 M/UL (ref 3.8–5.2)
SERVICE CMNT-IMP: ABNORMAL
WBC # BLD AUTO: 6.2 K/UL (ref 3.6–11)

## 2017-11-24 PROCEDURE — 74011250637 HC RX REV CODE- 250/637: Performed by: PHYSICAL MEDICINE & REHABILITATION

## 2017-11-24 PROCEDURE — 36415 COLL VENOUS BLD VENIPUNCTURE: CPT | Performed by: PHYSICAL MEDICINE & REHABILITATION

## 2017-11-24 PROCEDURE — 74011250636 HC RX REV CODE- 250/636: Performed by: PHYSICAL MEDICINE & REHABILITATION

## 2017-11-24 PROCEDURE — 85027 COMPLETE CBC AUTOMATED: CPT | Performed by: PHYSICAL MEDICINE & REHABILITATION

## 2017-11-24 RX ADMIN — TRAZODONE HYDROCHLORIDE 50 MG: 50 TABLET ORAL at 20:56

## 2017-11-24 RX ADMIN — METOPROLOL TARTRATE 25 MG: 25 TABLET ORAL at 20:56

## 2017-11-24 RX ADMIN — LEVETIRACETAM 1000 MG: 500 TABLET ORAL at 09:25

## 2017-11-24 RX ADMIN — LEVETIRACETAM 1000 MG: 500 TABLET ORAL at 20:57

## 2017-11-24 RX ADMIN — DILTIAZEM HYDROCHLORIDE 30 MG: 30 TABLET, FILM COATED ORAL at 09:19

## 2017-11-24 RX ADMIN — CARBIDOPA AND LEVODOPA 1 TABLET: 25; 100 TABLET ORAL at 16:44

## 2017-11-24 RX ADMIN — DIVALPROEX SODIUM 500 MG: 500 TABLET, EXTENDED RELEASE ORAL at 09:20

## 2017-11-24 RX ADMIN — DILTIAZEM HYDROCHLORIDE 30 MG: 30 TABLET, FILM COATED ORAL at 12:21

## 2017-11-24 RX ADMIN — METOPROLOL TARTRATE 25 MG: 25 TABLET ORAL at 09:18

## 2017-11-24 RX ADMIN — LEVOFLOXACIN 250 MG: 250 TABLET, FILM COATED ORAL at 09:20

## 2017-11-24 RX ADMIN — DOCUSATE SODIUM AND SENNOSIDES 1 TABLET: 8.6; 5 TABLET, FILM COATED ORAL at 20:56

## 2017-11-24 RX ADMIN — CARBIDOPA AND LEVODOPA 1 TABLET: 25; 100 TABLET ORAL at 05:20

## 2017-11-24 RX ADMIN — ENOXAPARIN SODIUM 40 MG: 40 INJECTION SUBCUTANEOUS at 20:57

## 2017-11-24 RX ADMIN — CARBIDOPA AND LEVODOPA 1 TABLET: 25; 100 TABLET ORAL at 20:57

## 2017-11-24 RX ADMIN — DILTIAZEM HYDROCHLORIDE 30 MG: 30 TABLET, FILM COATED ORAL at 16:44

## 2017-11-24 RX ADMIN — LORATADINE 10 MG: 10 TABLET ORAL at 09:20

## 2017-11-24 RX ADMIN — CALCIUM 500 MG: 500 TABLET ORAL at 09:18

## 2017-11-24 RX ADMIN — SIMVASTATIN 10 MG: 5 TABLET, FILM COATED ORAL at 20:56

## 2017-11-24 RX ADMIN — DIVALPROEX SODIUM 500 MG: 500 TABLET, EXTENDED RELEASE ORAL at 20:56

## 2017-11-25 LAB
BACTERIA SPEC CULT: ABNORMAL
CC UR VC: ABNORMAL
SERVICE CMNT-IMP: ABNORMAL

## 2017-11-25 PROCEDURE — 74011250637 HC RX REV CODE- 250/637: Performed by: PHYSICAL MEDICINE & REHABILITATION

## 2017-11-25 PROCEDURE — 74011250636 HC RX REV CODE- 250/636: Performed by: PHYSICAL MEDICINE & REHABILITATION

## 2017-11-25 RX ORDER — NITROFURANTOIN 25; 75 MG/1; MG/1
100 CAPSULE ORAL 2 TIMES DAILY WITH MEALS
Status: COMPLETED | OUTPATIENT
Start: 2017-11-25 | End: 2017-11-30

## 2017-11-25 RX ADMIN — LEVETIRACETAM 1000 MG: 500 TABLET ORAL at 09:10

## 2017-11-25 RX ADMIN — METOPROLOL TARTRATE 25 MG: 25 TABLET ORAL at 21:17

## 2017-11-25 RX ADMIN — LEVOFLOXACIN 250 MG: 250 TABLET, FILM COATED ORAL at 09:10

## 2017-11-25 RX ADMIN — CARBIDOPA AND LEVODOPA 1 TABLET: 25; 100 TABLET ORAL at 05:17

## 2017-11-25 RX ADMIN — DILTIAZEM HYDROCHLORIDE 30 MG: 30 TABLET, FILM COATED ORAL at 12:24

## 2017-11-25 RX ADMIN — DIVALPROEX SODIUM 500 MG: 500 TABLET, EXTENDED RELEASE ORAL at 21:17

## 2017-11-25 RX ADMIN — DIVALPROEX SODIUM 500 MG: 500 TABLET, EXTENDED RELEASE ORAL at 09:10

## 2017-11-25 RX ADMIN — CARBIDOPA AND LEVODOPA 1 TABLET: 25; 100 TABLET ORAL at 21:17

## 2017-11-25 RX ADMIN — DILTIAZEM HYDROCHLORIDE 30 MG: 30 TABLET, FILM COATED ORAL at 17:10

## 2017-11-25 RX ADMIN — METOPROLOL TARTRATE 25 MG: 25 TABLET ORAL at 09:09

## 2017-11-25 RX ADMIN — DILTIAZEM HYDROCHLORIDE 30 MG: 30 TABLET, FILM COATED ORAL at 09:10

## 2017-11-25 RX ADMIN — TRAZODONE HYDROCHLORIDE 50 MG: 50 TABLET ORAL at 21:17

## 2017-11-25 RX ADMIN — LORATADINE 10 MG: 10 TABLET ORAL at 09:10

## 2017-11-25 RX ADMIN — NITROFURANTOIN MONOHYDRATE/MACROCRYSTALLINE 100 MG: 25; 75 CAPSULE ORAL at 17:10

## 2017-11-25 RX ADMIN — CARBIDOPA AND LEVODOPA 1 TABLET: 25; 100 TABLET ORAL at 14:01

## 2017-11-25 RX ADMIN — SIMVASTATIN 10 MG: 5 TABLET, FILM COATED ORAL at 21:17

## 2017-11-25 RX ADMIN — ENOXAPARIN SODIUM 40 MG: 40 INJECTION SUBCUTANEOUS at 21:17

## 2017-11-25 RX ADMIN — CALCIUM 500 MG: 500 TABLET ORAL at 09:10

## 2017-11-25 RX ADMIN — LEVETIRACETAM 1000 MG: 500 TABLET ORAL at 21:17

## 2017-11-26 PROCEDURE — 74011250637 HC RX REV CODE- 250/637: Performed by: PHYSICAL MEDICINE & REHABILITATION

## 2017-11-26 PROCEDURE — 74011250636 HC RX REV CODE- 250/636: Performed by: PHYSICAL MEDICINE & REHABILITATION

## 2017-11-26 RX ADMIN — DILTIAZEM HYDROCHLORIDE 30 MG: 30 TABLET, FILM COATED ORAL at 12:33

## 2017-11-26 RX ADMIN — DILTIAZEM HYDROCHLORIDE 30 MG: 30 TABLET, FILM COATED ORAL at 17:14

## 2017-11-26 RX ADMIN — LORATADINE 10 MG: 10 TABLET ORAL at 09:15

## 2017-11-26 RX ADMIN — ENOXAPARIN SODIUM 40 MG: 40 INJECTION SUBCUTANEOUS at 21:00

## 2017-11-26 RX ADMIN — CARBIDOPA AND LEVODOPA 1 TABLET: 25; 100 TABLET ORAL at 12:33

## 2017-11-26 RX ADMIN — DIVALPROEX SODIUM 500 MG: 500 TABLET, EXTENDED RELEASE ORAL at 09:15

## 2017-11-26 RX ADMIN — DIVALPROEX SODIUM 500 MG: 500 TABLET, EXTENDED RELEASE ORAL at 20:53

## 2017-11-26 RX ADMIN — NITROFURANTOIN MONOHYDRATE/MACROCRYSTALLINE 100 MG: 25; 75 CAPSULE ORAL at 09:15

## 2017-11-26 RX ADMIN — LEVETIRACETAM 1000 MG: 500 TABLET ORAL at 20:53

## 2017-11-26 RX ADMIN — METOPROLOL TARTRATE 25 MG: 25 TABLET ORAL at 09:15

## 2017-11-26 RX ADMIN — DILTIAZEM HYDROCHLORIDE 30 MG: 30 TABLET, FILM COATED ORAL at 09:16

## 2017-11-26 RX ADMIN — CARBIDOPA AND LEVODOPA 1 TABLET: 25; 100 TABLET ORAL at 21:00

## 2017-11-26 RX ADMIN — CARBIDOPA AND LEVODOPA 1 TABLET: 25; 100 TABLET ORAL at 05:11

## 2017-11-26 RX ADMIN — NITROFURANTOIN MONOHYDRATE/MACROCRYSTALLINE 100 MG: 25; 75 CAPSULE ORAL at 17:14

## 2017-11-26 RX ADMIN — CALCIUM 500 MG: 500 TABLET ORAL at 09:15

## 2017-11-26 RX ADMIN — METOPROLOL TARTRATE 25 MG: 25 TABLET ORAL at 20:51

## 2017-11-26 RX ADMIN — LEVETIRACETAM 1000 MG: 500 TABLET ORAL at 09:15

## 2017-11-26 RX ADMIN — SIMVASTATIN 10 MG: 5 TABLET, FILM COATED ORAL at 21:00

## 2017-11-27 PROCEDURE — 74011000250 HC RX REV CODE- 250: Performed by: PHYSICAL MEDICINE & REHABILITATION

## 2017-11-27 PROCEDURE — 74011250636 HC RX REV CODE- 250/636: Performed by: PHYSICAL MEDICINE & REHABILITATION

## 2017-11-27 PROCEDURE — 74011250637 HC RX REV CODE- 250/637: Performed by: PHYSICAL MEDICINE & REHABILITATION

## 2017-11-27 RX ORDER — BACITRACIN ZINC 500 [USP'U]/G
1 CREAM TOPICAL 2 TIMES DAILY
Status: DISCONTINUED | OUTPATIENT
Start: 2017-11-27 | End: 2017-12-01 | Stop reason: HOSPADM

## 2017-11-27 RX ADMIN — DILTIAZEM HYDROCHLORIDE 30 MG: 30 TABLET, FILM COATED ORAL at 17:45

## 2017-11-27 RX ADMIN — ENOXAPARIN SODIUM 40 MG: 40 INJECTION SUBCUTANEOUS at 20:59

## 2017-11-27 RX ADMIN — DIVALPROEX SODIUM 500 MG: 500 TABLET, EXTENDED RELEASE ORAL at 20:58

## 2017-11-27 RX ADMIN — METOPROLOL TARTRATE 25 MG: 25 TABLET ORAL at 20:57

## 2017-11-27 RX ADMIN — METOPROLOL TARTRATE 25 MG: 25 TABLET ORAL at 09:33

## 2017-11-27 RX ADMIN — CALCIUM 500 MG: 500 TABLET ORAL at 09:43

## 2017-11-27 RX ADMIN — CARBIDOPA AND LEVODOPA 1 TABLET: 25; 100 TABLET ORAL at 20:59

## 2017-11-27 RX ADMIN — LEVETIRACETAM 1000 MG: 500 TABLET ORAL at 20:58

## 2017-11-27 RX ADMIN — LORATADINE 10 MG: 10 TABLET ORAL at 09:33

## 2017-11-27 RX ADMIN — NITROFURANTOIN MONOHYDRATE/MACROCRYSTALLINE 100 MG: 25; 75 CAPSULE ORAL at 17:45

## 2017-11-27 RX ADMIN — DILTIAZEM HYDROCHLORIDE 30 MG: 30 TABLET, FILM COATED ORAL at 12:13

## 2017-11-27 RX ADMIN — CARBIDOPA AND LEVODOPA 1 TABLET: 25; 100 TABLET ORAL at 05:49

## 2017-11-27 RX ADMIN — BACITRACIN ZINC 1 PACKET: 500 OINTMENT TOPICAL at 14:54

## 2017-11-27 RX ADMIN — DILTIAZEM HYDROCHLORIDE 30 MG: 30 TABLET, FILM COATED ORAL at 09:33

## 2017-11-27 RX ADMIN — DIVALPROEX SODIUM 500 MG: 500 TABLET, EXTENDED RELEASE ORAL at 09:32

## 2017-11-27 RX ADMIN — CARBIDOPA AND LEVODOPA 1 TABLET: 25; 100 TABLET ORAL at 14:54

## 2017-11-27 RX ADMIN — NITROFURANTOIN MONOHYDRATE/MACROCRYSTALLINE 100 MG: 25; 75 CAPSULE ORAL at 09:32

## 2017-11-27 RX ADMIN — LEVETIRACETAM 1000 MG: 500 TABLET ORAL at 09:32

## 2017-11-27 RX ADMIN — SIMVASTATIN 10 MG: 5 TABLET, FILM COATED ORAL at 20:57

## 2017-11-27 RX ADMIN — BACITRACIN ZINC 1 PACKET: 500 OINTMENT TOPICAL at 21:00

## 2017-11-28 PROCEDURE — 74011000250 HC RX REV CODE- 250: Performed by: PHYSICAL MEDICINE & REHABILITATION

## 2017-11-28 PROCEDURE — 74011250637 HC RX REV CODE- 250/637: Performed by: PHYSICAL MEDICINE & REHABILITATION

## 2017-11-28 PROCEDURE — 74011250636 HC RX REV CODE- 250/636: Performed by: PHYSICAL MEDICINE & REHABILITATION

## 2017-11-28 RX ADMIN — LORATADINE 10 MG: 10 TABLET ORAL at 08:50

## 2017-11-28 RX ADMIN — BACITRACIN ZINC 1 PACKET: 500 OINTMENT TOPICAL at 08:50

## 2017-11-28 RX ADMIN — DILTIAZEM HYDROCHLORIDE 30 MG: 30 TABLET, FILM COATED ORAL at 08:50

## 2017-11-28 RX ADMIN — LEVETIRACETAM 1000 MG: 500 TABLET ORAL at 08:49

## 2017-11-28 RX ADMIN — DIVALPROEX SODIUM 500 MG: 500 TABLET, EXTENDED RELEASE ORAL at 08:49

## 2017-11-28 RX ADMIN — CARBIDOPA AND LEVODOPA 1 TABLET: 25; 100 TABLET ORAL at 05:33

## 2017-11-28 RX ADMIN — NITROFURANTOIN MONOHYDRATE/MACROCRYSTALLINE 100 MG: 25; 75 CAPSULE ORAL at 08:49

## 2017-11-28 RX ADMIN — SIMVASTATIN 10 MG: 5 TABLET, FILM COATED ORAL at 21:42

## 2017-11-28 RX ADMIN — CARBIDOPA AND LEVODOPA 1 TABLET: 25; 100 TABLET ORAL at 13:02

## 2017-11-28 RX ADMIN — DILTIAZEM HYDROCHLORIDE 30 MG: 30 TABLET, FILM COATED ORAL at 17:25

## 2017-11-28 RX ADMIN — ENOXAPARIN SODIUM 40 MG: 40 INJECTION SUBCUTANEOUS at 21:44

## 2017-11-28 RX ADMIN — METOPROLOL TARTRATE 25 MG: 25 TABLET ORAL at 21:42

## 2017-11-28 RX ADMIN — LEVETIRACETAM 1000 MG: 500 TABLET ORAL at 21:43

## 2017-11-28 RX ADMIN — NITROFURANTOIN MONOHYDRATE/MACROCRYSTALLINE 100 MG: 25; 75 CAPSULE ORAL at 17:25

## 2017-11-28 RX ADMIN — BACITRACIN ZINC 1 PACKET: 500 OINTMENT TOPICAL at 21:43

## 2017-11-28 RX ADMIN — CALCIUM 500 MG: 500 TABLET ORAL at 08:49

## 2017-11-28 RX ADMIN — DIVALPROEX SODIUM 500 MG: 500 TABLET, EXTENDED RELEASE ORAL at 21:43

## 2017-11-28 RX ADMIN — DILTIAZEM HYDROCHLORIDE 30 MG: 30 TABLET, FILM COATED ORAL at 12:38

## 2017-11-28 RX ADMIN — CARBIDOPA AND LEVODOPA 1 TABLET: 25; 100 TABLET ORAL at 21:42

## 2017-11-28 RX ADMIN — METOPROLOL TARTRATE 25 MG: 25 TABLET ORAL at 08:49

## 2017-11-29 PROCEDURE — 74011250636 HC RX REV CODE- 250/636: Performed by: PHYSICAL MEDICINE & REHABILITATION

## 2017-11-29 PROCEDURE — 74011000250 HC RX REV CODE- 250: Performed by: PHYSICAL MEDICINE & REHABILITATION

## 2017-11-29 PROCEDURE — 74011250637 HC RX REV CODE- 250/637: Performed by: PHYSICAL MEDICINE & REHABILITATION

## 2017-11-29 RX ORDER — ASPIRIN 81 MG/1
81 TABLET ORAL DAILY
Status: DISCONTINUED | OUTPATIENT
Start: 2017-11-29 | End: 2017-12-01 | Stop reason: HOSPADM

## 2017-11-29 RX ADMIN — LORATADINE 10 MG: 10 TABLET ORAL at 08:29

## 2017-11-29 RX ADMIN — DIVALPROEX SODIUM 500 MG: 500 TABLET, EXTENDED RELEASE ORAL at 08:29

## 2017-11-29 RX ADMIN — SIMVASTATIN 10 MG: 5 TABLET, FILM COATED ORAL at 21:08

## 2017-11-29 RX ADMIN — CARBIDOPA AND LEVODOPA 1 TABLET: 25; 100 TABLET ORAL at 14:42

## 2017-11-29 RX ADMIN — LEVETIRACETAM 1000 MG: 500 TABLET ORAL at 21:08

## 2017-11-29 RX ADMIN — BACITRACIN ZINC 1 PACKET: 500 OINTMENT TOPICAL at 08:29

## 2017-11-29 RX ADMIN — METOPROLOL TARTRATE 25 MG: 25 TABLET ORAL at 08:29

## 2017-11-29 RX ADMIN — DILTIAZEM HYDROCHLORIDE 30 MG: 30 TABLET, FILM COATED ORAL at 17:33

## 2017-11-29 RX ADMIN — DILTIAZEM HYDROCHLORIDE 30 MG: 30 TABLET, FILM COATED ORAL at 12:38

## 2017-11-29 RX ADMIN — DILTIAZEM HYDROCHLORIDE 30 MG: 30 TABLET, FILM COATED ORAL at 08:29

## 2017-11-29 RX ADMIN — ENOXAPARIN SODIUM 40 MG: 40 INJECTION SUBCUTANEOUS at 21:08

## 2017-11-29 RX ADMIN — LEVETIRACETAM 1000 MG: 500 TABLET ORAL at 08:29

## 2017-11-29 RX ADMIN — BACITRACIN ZINC 1 PACKET: 500 OINTMENT TOPICAL at 21:08

## 2017-11-29 RX ADMIN — CARBIDOPA AND LEVODOPA 1 TABLET: 25; 100 TABLET ORAL at 21:08

## 2017-11-29 RX ADMIN — DIVALPROEX SODIUM 500 MG: 500 TABLET, EXTENDED RELEASE ORAL at 21:08

## 2017-11-29 RX ADMIN — NITROFURANTOIN MONOHYDRATE/MACROCRYSTALLINE 100 MG: 25; 75 CAPSULE ORAL at 17:34

## 2017-11-29 RX ADMIN — NITROFURANTOIN MONOHYDRATE/MACROCRYSTALLINE 100 MG: 25; 75 CAPSULE ORAL at 08:28

## 2017-11-29 RX ADMIN — ASPIRIN 81 MG: 81 TABLET, COATED ORAL at 12:38

## 2017-11-29 RX ADMIN — METOPROLOL TARTRATE 25 MG: 25 TABLET ORAL at 21:08

## 2017-11-29 RX ADMIN — CARBIDOPA AND LEVODOPA 1 TABLET: 25; 100 TABLET ORAL at 05:00

## 2017-11-29 RX ADMIN — CALCIUM 500 MG: 500 TABLET ORAL at 08:00

## 2017-11-30 VITALS
DIASTOLIC BLOOD PRESSURE: 68 MMHG | BODY MASS INDEX: 25.73 KG/M2 | HEIGHT: 65 IN | SYSTOLIC BLOOD PRESSURE: 112 MMHG | HEART RATE: 76 BPM | WEIGHT: 154.44 LBS

## 2017-11-30 PROCEDURE — 74011000250 HC RX REV CODE- 250: Performed by: PHYSICAL MEDICINE & REHABILITATION

## 2017-11-30 PROCEDURE — 74011250637 HC RX REV CODE- 250/637: Performed by: PHYSICAL MEDICINE & REHABILITATION

## 2017-11-30 PROCEDURE — 74011250636 HC RX REV CODE- 250/636: Performed by: PHYSICAL MEDICINE & REHABILITATION

## 2017-11-30 RX ADMIN — SIMVASTATIN 10 MG: 5 TABLET, FILM COATED ORAL at 21:17

## 2017-11-30 RX ADMIN — CARBIDOPA AND LEVODOPA 1 TABLET: 25; 100 TABLET ORAL at 21:18

## 2017-11-30 RX ADMIN — METOPROLOL TARTRATE 25 MG: 25 TABLET ORAL at 08:34

## 2017-11-30 RX ADMIN — ASPIRIN 81 MG: 81 TABLET, COATED ORAL at 08:34

## 2017-11-30 RX ADMIN — BACITRACIN ZINC 1 PACKET: 500 OINTMENT TOPICAL at 21:17

## 2017-11-30 RX ADMIN — ENOXAPARIN SODIUM 40 MG: 40 INJECTION SUBCUTANEOUS at 21:17

## 2017-11-30 RX ADMIN — DILTIAZEM HYDROCHLORIDE 30 MG: 30 TABLET, FILM COATED ORAL at 17:30

## 2017-11-30 RX ADMIN — LEVETIRACETAM 1000 MG: 500 TABLET ORAL at 21:17

## 2017-11-30 RX ADMIN — LEVETIRACETAM 1000 MG: 500 TABLET ORAL at 08:34

## 2017-11-30 RX ADMIN — CARBIDOPA AND LEVODOPA 1 TABLET: 25; 100 TABLET ORAL at 05:59

## 2017-11-30 RX ADMIN — DILTIAZEM HYDROCHLORIDE 30 MG: 30 TABLET, FILM COATED ORAL at 08:34

## 2017-11-30 RX ADMIN — DILTIAZEM HYDROCHLORIDE 30 MG: 30 TABLET, FILM COATED ORAL at 12:29

## 2017-11-30 RX ADMIN — CARBIDOPA AND LEVODOPA 1 TABLET: 25; 100 TABLET ORAL at 14:09

## 2017-11-30 RX ADMIN — DIVALPROEX SODIUM 500 MG: 500 TABLET, EXTENDED RELEASE ORAL at 21:27

## 2017-11-30 RX ADMIN — BACITRACIN ZINC 1 PACKET: 500 OINTMENT TOPICAL at 08:34

## 2017-11-30 RX ADMIN — METOPROLOL TARTRATE 25 MG: 25 TABLET ORAL at 21:17

## 2017-11-30 RX ADMIN — DIVALPROEX SODIUM 500 MG: 500 TABLET, EXTENDED RELEASE ORAL at 08:34

## 2017-11-30 RX ADMIN — NITROFURANTOIN MONOHYDRATE/MACROCRYSTALLINE 100 MG: 25; 75 CAPSULE ORAL at 08:34

## 2017-11-30 RX ADMIN — CALCIUM 500 MG: 500 TABLET ORAL at 08:34

## 2017-11-30 RX ADMIN — LORATADINE 10 MG: 10 TABLET ORAL at 08:34

## 2017-12-01 ENCOUNTER — TELEPHONE (OUTPATIENT)
Dept: NEUROLOGY | Age: 77
End: 2017-12-01

## 2017-12-01 PROCEDURE — 74011000250 HC RX REV CODE- 250: Performed by: PHYSICAL MEDICINE & REHABILITATION

## 2017-12-01 PROCEDURE — 74011250637 HC RX REV CODE- 250/637: Performed by: PHYSICAL MEDICINE & REHABILITATION

## 2017-12-01 RX ADMIN — BACITRACIN ZINC 1 PACKET: 500 OINTMENT TOPICAL at 10:08

## 2017-12-01 RX ADMIN — CALCIUM 500 MG: 500 TABLET ORAL at 10:08

## 2017-12-01 RX ADMIN — CARBIDOPA AND LEVODOPA 1 TABLET: 25; 100 TABLET ORAL at 06:05

## 2017-12-01 RX ADMIN — DILTIAZEM HYDROCHLORIDE 30 MG: 30 TABLET, FILM COATED ORAL at 10:08

## 2017-12-01 RX ADMIN — ASPIRIN 81 MG: 81 TABLET, COATED ORAL at 10:08

## 2017-12-01 RX ADMIN — LEVETIRACETAM 1000 MG: 500 TABLET ORAL at 10:08

## 2017-12-01 RX ADMIN — DIVALPROEX SODIUM 500 MG: 500 TABLET, EXTENDED RELEASE ORAL at 10:08

## 2017-12-01 RX ADMIN — LORATADINE 10 MG: 10 TABLET ORAL at 10:08

## 2017-12-01 RX ADMIN — METOPROLOL TARTRATE 25 MG: 25 TABLET ORAL at 10:08

## 2017-12-01 NOTE — TELEPHONE ENCOUNTER
Contacted patient's son.  He declined first available appointment with NP and scheduled follow up for 12/26 @ 3:30 pm

## 2017-12-01 NOTE — TELEPHONE ENCOUNTER
Called and s/w patient's son to let him know that Dr. Lisbeth Villagomez was no longer at our office, and that she had moved to the [place] office. He wishes for his mother to continue to see Dr. Lisbeth Villagomez, and I am forwarding this message to you.      Thanks

## 2017-12-13 ENCOUNTER — OFFICE VISIT (OUTPATIENT)
Dept: NEUROLOGY | Age: 77
End: 2017-12-13

## 2017-12-13 VITALS
HEART RATE: 73 BPM | SYSTOLIC BLOOD PRESSURE: 118 MMHG | OXYGEN SATURATION: 97 % | DIASTOLIC BLOOD PRESSURE: 62 MMHG | HEIGHT: 65 IN

## 2017-12-13 DIAGNOSIS — R25.9 MIXED ACTION AND RESTING TREMOR: ICD-10-CM

## 2017-12-13 DIAGNOSIS — G40.109 PARTIAL SYMPTOMATIC EPILEPSY WITH SIMPLE PARTIAL SEIZURES, NOT INTRACTABLE, WITHOUT STATUS EPILEPTICUS (HCC): Primary | ICD-10-CM

## 2017-12-13 RX ORDER — LANOLIN ALCOHOL/MO/W.PET/CERES
CREAM (GRAM) TOPICAL
COMMUNITY

## 2017-12-13 RX ORDER — TRIAMCINOLONE ACETONIDE 1 MG/G
CREAM TOPICAL
COMMUNITY
Start: 2017-12-11

## 2017-12-13 NOTE — PROGRESS NOTES
Date:            2017    Name:  Ryley Oshea  :  1940  MRN:  [id number]     PCP:  Anamaria Porras MD    Chief Complaint   [facility]   [facility]    Seizure     Last seizure 3 weeks ago. HISTORY OF PRESENT ILLNESS:  Rosie Levine is a 76 y.o., female who presents today for follow up for seizures. She has a history of meningioma resection, and stroke after that with residual left-sided weakness. After that, she began having seizures that were always staring spells. She was last seen by Dr. Soren Berg in November of last year, she was on Keppra and Depakote at that time and has never been on any other medication. She has never had side effects of those medications that she is aware of. .  She also is treated for Parkinson's, reports a history of tremor that improved with Sinemet. Her son reports that he did see a tremor a few months ago, but this was when she was holding a newspaper. She was in the hospital last month for seizure. She had been very busy, went to her grandson's football game where she had to walk a long way without her walker, then she had to sit in the bleachers with no back support. When she got dropped off, she had to rush to get dinner. When she got home, she started to feel her legs get weak so she deliberately slid onto the floor. She pulled the cover with her onto the floor to protect her head. She was on the floor all night, 16 hours and she missed her morning meds. Her son has witnessed her seizures in the past, she will generally stare off into space and look the wrong way when she is talking. She did exactly this when she was talking to her son while getting loaded into the ambulance. As far as possible Parkinson's, she is on Sinemet 3 times a day with no real tremor now. At one point she was on Sinemet 1 time a day and did not see any worsening of her symptoms or more difficulty with walking.   After discharge, she had to go to rehab due to strength issues, is getting around ok now but she is a little more tired, especially in the afternoon. She is working with PT and OT at home. MRI showed stable encephalomalacia  EEG with diffuse slowing, right worse than left, but no seizures  Depakote level normal in hospital    11.19.2017 hospital history  Ms. Annie Douglas is a 78-year-old woman with a neurologic history remarkable for stroke, symptomatic epilepsy after craniotomy for meningioma resection (residual left weakness) here for new weakness. She tells me that on Saturday she had done a lot of walking more than her normal.  Typically when she goes out she has a Rollator and at home she uses a single-point cane. That day she only had her cane and had to walk a significant amount of mileage during a football game. That evening after taking her nighttime medications she was walking to bed and felt both of her legs become weak suddenly. She controlled herself down to the ground. She did not fall dramatically. She says she was on the ground all night until she was able to pull herself up onto the bed. While on the floor she felt trembling of her left side then becoming generalized. She recalls this. Eventually someone found her at home and had her brought to the hospital.  She is followed by [facility] neurology. She is on Keppra and Depakote daily. She tells me she feels her normal currently. 11.4.2016 conniemanuel  Zenia Marie is a 64 y.o. female who I am asked to see in consultation for seizures. She had a benign menigioma (some notes indicate aneurysm but family was told \"benign tumor\") in the frontal lobe. She only had a symptom of crying and not being able to stop. She also had slurred speech. She required surgery (bifrontal crani) and following this had a stroke that gave her weakness on the left side. She will also have episodes of having a blank stare and then her left side will go weak.  She will have this about once per year since then. No prior history of seizure. Her son does monitor meds to make sure she is taking them all. She is watching them as well and can tell her son if he messed up her dose.     Previous AEDs:  None     Current AEDs:  Keppra 1G BID  Depakote ER 750mg BID     She reports in 2014 she was told she had Parkinsons. She had hand tremors with action and rest. She was started on sinemet. She had She is on sinemet 25/100 with 1 tab TID. She says that her tremor is much improved on this.  Jennifer Schmidt last admission was in May 2016 for several focal seizures in the setting of dehydration and diarrhea. Levels since then are in media tab. Her memory is okay. She has some issues remembering the day. She will misplace things at times but will be able to find it.        Current Outpatient Prescriptions   Medication Sig    triamcinolone acetonide (KENALOG) 0.1 % topical cream     carbidopa-levodopa (SINEMET)  mg per tablet Take 1 Tab by mouth three (3) times daily.  levoFLOXacin (LEVAQUIN) 250 mg tablet Take 1 Tab by mouth every twenty-four (24) hours.  lidocaine 5 % topical cream Apply  to affected area two (2) times daily as needed for Pain (Left ankle & foot).  furosemide (LASIX) 20 mg tablet Take 20 mg by mouth daily as needed.  calcium carbonate (OS-MOIZ) 500 mg calcium (1,250 mg) tablet Take 1 Tab by mouth Daily (before breakfast).  divalproex ER (DEPAKOTE ER) 250 mg ER tablet Take 500 mg by mouth every twelve (12) hours.  levETIRAcetam 1,000 mg tablet Take 1,000 mg by mouth two (2) times a day.  metoprolol tartrate (LOPRESSOR) 25 mg tablet Take 25 mg by mouth two (2) times a day.  simvastatin (ZOCOR) 10 mg tablet Take 10 mg by mouth nightly.  loratadine (CLARITIN) 10 mg tablet Take 10 mg by mouth daily.  aspirin delayed-release 81 mg tablet Take 81 mg by mouth daily.  multivitamin (ONE A DAY) tablet Take 1 Tab by mouth daily.      No current facility-administered medications for this visit. Allergies   Allergen Reactions    Latex Hives    Amoxicillin Hives    Penicillins Other (comments)     Mouth sores    Prednisone Other (comments)    Sulfa (Sulfonamide Antibiotics) Other (comments)     Stomach upset     Past Medical History:   Diagnosis Date    Anemia     High cholesterol     History of MI (myocardial infarction)     x 2    Hx of Parkinson's disease     Hx of partial seizures     Seizures (HCC)     Stroke [facility])      Past Surgical History:   Procedure Laterality Date    CARDIAC SURG PROCEDURE UNLIST      HX CRANIOTOMY       Social History     Social History    Marital status: UNKNOWN     Spouse name: N/A    Number of children: N/A    Years of education: N/A     Occupational History    Not on file. Social History Main Topics    Smoking status: Never Smoker    Smokeless tobacco: Never Used    Alcohol use No    Drug use: No    Sexual activity: Not on file     Other Topics Concern    Not on file     Social History Narrative     No family history on file. PHYSICAL EXAMINATION:    Visit Vitals    /62    Pulse 73    Ht 5' 5\" (1.651 m)    SpO2 97%     General:  Well defined, nourished, and groomed individual in no acute distress. Neck: Supple, nontender, no bruits, no pain with resistance to active range of motion. Heart: Regular rate and rhythm, no murmurs, rub, or gallop. Normal S1S2. Lungs:  Clear to auscultation bilaterally with equal chest expansion, no cough, no wheeze  Musculoskeletal:  Extremities revealed no edema and had full range of motion of joints. Psych:  Good mood and bright affect    NEUROLOGICAL EXAMINATION:     Mental Status:   Alert and oriented to person, place, and time with recent and remote memory intact. Attention span and concentration are normal. Speech is fluent with a full fund of knowledge. Cranial Nerves:    II, III, IV, VI:  Visual acuity grossly intact.    Pupils are equal, round, and reactive to light.    Extra-ocular movements are full and fluid. No ptosis or nystagmus. V-XII: Hearing is grossly intact. Facial features are symmetric, with normal sensation and strength. The palate rises symmetrically and the tongue protrudes midline. Sternocleidomastoids 5/5. Motor Examination: Normal tone, bulk, and strength, 5/5 muscle strength throughout. No cogwheel rigidity  Coordination:  Finger to nose testing was normal.   No resting or intention tremor. No bradykinesia  Gait and Station:  Steady while walking with walker, and for a few steps without. She has no shuffling of her gait. Posture is a little stooped but likely because she is using a walker. Normal arm swing. No pronator drift. No muscle wasting or fasciculations noted. ASSESSMENT AND PLAN    ICD-10-CM ICD-9-CM    1. Partial symptomatic epilepsy with simple partial seizures, not intractable, without status epilepticus (HCC) G40.109 345.50    2. Mixed action and resting tremor R25.9 66.0      69-year-old female seen in follow-up for seizures. She has had a seizure was about once a year since having meningioma resection,  usually provoked. Her last seizure was provoked by exhaustion, and missing her medications. She is a little bit weak, but generally recovering well. She also has been treated for Parkinson's disease for years, has no evidence of Parkinson's disease on exam today but also has taken her Sinemet. She one point was only taking one Sinemet per day, so no worsening of tremor or gait. In fact, her son only reports that when she has a tremor it is usually when she is holding the newspaper. 1.  Continue Keppra 1000 mg twice daily, does not appear to be suffering from toxicity of this drug  2. Continue Depakote 750 mg twice daily, also no toxicity of this drug  3. Continue working with PT to rebuild strength  4. Suggested that she get a life alert so she can call for help if she falls in her apartment  5.   Can try to wean off of her Sinemet and see if gait becomes more rigid, slow, or if resting tremor returns  6. Discussed the difference between parkinsonian and essential tremor    Follow-up in 3 months to discuss whether she needs to continue on Sinemet, call sooner with concerns      Yudy Bales NP    This note was created using voice recognition software. Despite editing, there may be syntax errors.

## 2017-12-13 NOTE — MR AVS SNAPSHOT
Visit Information Date & Time Provider Department Dept. Phone Encounter #  
 12/13/2017  3:30 PM Sully Garcia NP [facility] [phone] [id number] Upcoming Health Maintenance Date Due DTaP/Tdap/Td series (1 - Tdap) 2/22/1961 ZOSTER VACCINE AGE 60> 12/22/1997 GLAUCOMA SCREENING Q2Y 2/22/2003 Pneumococcal 65+ Low/Medium Risk (1 of 2 - PCV13) 2/22/2003 MEDICARE YEARLY EXAM 2/22/2003 Influenza Age 5 to Adult 8/1/2017 Allergies as of 12/13/2017  Review Complete On: 12/13/2017 By: Tee Olmstead LPN Severity Noted Reaction Type Reactions Latex  11/19/2017    Hives Amoxicillin  11/19/2017    Hives Penicillins  06/16/2011    Other (comments) Mouth sores Prednisone  11/10/2014    Other (comments) Sulfa (Sulfonamide Antibiotics)  06/16/2011    Other (comments) Stomach upset Current Immunizations  Never Reviewed No immunizations on file. Not reviewed this visit You Were Diagnosed With   
  
 Codes Comments Partial symptomatic epilepsy with simple partial seizures, not intractable, without status epilepticus ([facility]    -  Primary ICD-10-CM: G40.109 ICD-9-CM: 345.50 Vitals BP Pulse Height(growth percentile) SpO2 Smoking Status 118/62 73 5' 5\" (1.651 m) 97% Never Smoker Preferred Pharmacy Pharmacy Name [facility] [phone] Your Updated Medication List  
  
   
This list is accurate as of: 12/13/17  4:10 PM.  Always use your most recent med list.  
  
  
  
  
 aspirin delayed-release 81 mg tablet Take 81 mg by mouth daily. carbidopa-levodopa  mg per tablet Commonly known as:  SINEMET Take 1 Tab by mouth three (3) times daily. divalproex  mg ER tablet Commonly known as:  DEPAKOTE ER Take 500 mg by mouth every twelve (12) hours. Iron 325 mg (65 mg iron) tablet Generic drug:  ferrous sulfate Take  by mouth Daily (before breakfast). levETIRAcetam 1,000 mg tablet Take 1,000 mg by mouth two (2) times a day. levoFLOXacin 250 mg tablet Commonly known as:  Nydia Manda Take 1 Tab by mouth every twenty-four (24) hours. loratadine 10 mg tablet Commonly known as:  Ocontodax Handyg Take 10 mg by mouth daily. metoprolol tartrate 25 mg tablet Commonly known as:  LOPRESSOR Take 25 mg by mouth two (2) times a day. multivitamin tablet Commonly known as:  ONE A DAY Take 1 Tab by mouth daily. OS-MOIZ 250+D PO Take  by mouth. simvastatin 10 mg tablet Commonly known as:  ZOCOR Take 10 mg by mouth nightly. triamcinolone acetonide 0.1 % topical cream  
Commonly known as:  KENALOG Patient Instructions Try skipping 1 of your doses of carbidopa levodopa, see if your tremor and walking get worse off the medication. If they do not get worse, he can drop a second dose and then the third. If you are walking and resting tremor do not get worse off the medication, you do not need to be treated for Parkinson's PRESCRIPTION REFILL POLICY [facility] Statement to Patients April 1, 2014 In an effort to ensure the large volume of patient prescription refills is processed in the most efficient and expeditious manner, we are asking our patients to assist us by calling your Pharmacy for all prescription refills, this will include also your  Mail Order Pharmacy. The pharmacy will contact our office electronically to continue the refill process. Please do not wait until the last minute to call your pharmacy. We need at least 48 hours (2days) to fill prescriptions. We also encourage you to call your pharmacy before going to  your prescription to make sure it is ready.   
 
With regard to controlled substance prescription refill requests (narcotic refills) that need to be picked up at our office, we ask your cooperation by providing us with at least 72 hours (3days) notice that you will need a refill. We will not refill narcotic prescription refill requests after 4:00pm on any weekday, Monday through Thursday, or after 2:00pm on Fridays, or on the weekends. We encourage everyone to explore another way of getting your prescription refill request processed using [employer], our patient web portal through our electronic medical record system. Glyde is an efficient and effective way to communicate your medication request directly to the office and  downloadable as an brian on your smart phone . Glyde also features a review functionality that allows you to view your medication list as well as leave messages for your physician. Are you ready to get connected? If so please review the attatched instructions or speak to any of our staff to get you set up right away! Thank you so much for your cooperation. Should you have any questions please contact our Practice Administrator. The Physicians and Staff,  [facility] A Healthy Lifestyle: Care Instructions Your Care Instructions A healthy lifestyle can help you feel good, stay at a healthy weight, and have plenty of energy for both work and play. A healthy lifestyle is something you can share with your whole family. A healthy lifestyle also can lower your risk for serious health problems, such as high blood pressure, heart disease, and diabetes. You can follow a few steps listed below to improve your health and the health of your family. Follow-up care is a key part of your treatment and safety. Be sure to make and go to all appointments, and call your doctor if you are having problems. It's also a good idea to know your test results and keep a list of the medicines you take. How can you care for yourself at home? · Do not eat too much sugar, fat, or fast foods. You can still have dessert and treats now and then. The goal is moderation. · Start small to improve your eating habits. Pay attention to portion sizes, drink less juice and soda pop, and eat more fruits and vegetables. ¨ Eat a healthy amount of food. A 3-ounce serving of meat, for example, is about the size of a deck of cards. Fill the rest of your plate with vegetables and whole grains. ¨ Limit the amount of soda and sports drinks you have every day. Drink more water when you are thirsty. ¨ Eat at least 5 servings of fruits and vegetables every day. It may seem like a lot, but it is not hard to reach this goal. A serving or helping is 1 piece of fruit, 1 cup of vegetables, or 2 cups of leafy, raw vegetables. Have an apple or some carrot sticks as an afternoon snack instead of a candy bar. Try to have fruits and/or vegetables at every meal. 
· Make exercise part of your daily routine. You may want to start with simple activities, such as walking, bicycling, or slow swimming. Try to be active 30 to 60 minutes every day. You do not need to do all 30 to 60 minutes all at once. For example, you can exercise 3 times a day for 10 or 20 minutes. Moderate exercise is safe for most people, but it is always a good idea to talk to your doctor before starting an exercise program. 
· Keep moving. [employer] the lawn, work in the garden, or "[employer]. Take the stairs instead of the elevator at work. · If you smoke, quit. People who smoke have an increased risk for heart attack, stroke, cancer, and other lung illnesses. Quitting is hard, but there are ways to boost your chance of quitting tobacco for good. ¨ Use nicotine gum, patches, or lozenges. ¨ Ask your doctor about stop-smoking programs and medicines. ¨ Keep trying.  
In addition to reducing your risk of diseases in the future, you will notice some benefits soon after you stop using tobacco. If you have shortness of breath or asthma symptoms, they will likely get better within a few weeks after you quit. · Limit how much alcohol you drink. Moderate amounts of alcohol (up to 2 drinks a day for men, 1 drink a day for women) are okay. But drinking too much can lead to liver problems, high blood pressure, and other health problems. Family health If you have a family, there are many things you can do together to improve your health. · Eat meals together as a family as often as possible. · Eat healthy foods. This includes fruits, vegetables, lean meats and dairy, and whole grains. · Include your family in your fitness plan. Most people think of activities such as jogging or tennis as the way to fitness, but there are many ways you and your family can be more active. Anything that makes you breathe hard and gets your heart pumping is exercise. Here are some tips: 
¨ Walk to do errands or to take your child to school or the bus. ¨ Go for a family bike ride after dinner instead of watching TV. Where can you learn more? Go to http://fredi-josefina.info/. Enter U976 in the search box to learn more about \"A Healthy Lifestyle: Care Instructions. \" Current as of: May 12, 2017 Content Version: 11.4 © 0826-4566 [employer]wise[employer]. Care instructions adapted under license by [employer] (which disclaims liability or warranty for this information). If you have questions about a medical condition or this instruction, always ask your healthcare professional. [place] [address] any warranty or liability for your use of this information. Introducing [facility]! [employer] introduces [employer] patient portal. Now you can access parts of your medical record, email your doctor's office, and request medication refills online. 1. In your internet browser, go to [email or web address] 2. Click on the First Time User? Click Here link in the Sign In box. You will see the New Member Sign Up page. 3. Enter your [employer] Access Code exactly as it appears below. You will not need to use this code after youve completed the sign-up process. If you do not sign up before the expiration date, you must request a new code. · [employer] Access Code: [email or web address] Expires: 2/20/2018  1:49 PM 
 
4. Enter the last four digits of your Social Security Number (xxxx) and Date of Birth (mm/dd/yyyy) as indicated and click Submit. You will be taken to the next sign-up page. 5. Create a [employer] ID. This will be your [employer] login ID and cannot be changed, so think of one that is secure and easy to remember. 6. Create a [employer] password. You can change your password at any time. 7. Enter your Password Reset Question and Answer. This can be used at a later time if you forget your password. 8. Enter your e-mail address. You will receive e-mail notification when new information is available in [address] 9. Click Sign Up. You can now view and download portions of your medical record. 10. Click the Download Summary menu link to download a portable copy of your medical information. If you have questions, please visit the Frequently Asked Questions section of the [employer] website. Remember, [employer] is NOT to be used for urgent needs. For medical emergencies, dial 911. Now available from your iPhone and Android! Please provide this summary of care documentation to your next provider. Your primary care clinician is listed as Phys Other. If you have any questions after today's visit, please call [phone].

## 2017-12-13 NOTE — PATIENT INSTRUCTIONS
Try skipping 1 of your doses of carbidopa levodopa, see if your tremor and walking get worse off the medication. If they do not get worse, he can drop a second dose and then the third. If you are walking and resting tremor do not get worse off the medication, you do not need to be treated for Parkinson's        [facility]   Statement to Patients  April 1, 2014      In an effort to ensure the large volume of patient prescription refills is processed in the most efficient and expeditious manner, we are asking our patients to assist us by calling your Pharmacy for all prescription refills, this will include also your  Mail Order Pharmacy. The pharmacy will contact our office electronically to continue the refill process. Please do not wait until the last minute to call your pharmacy. We need at least 48 hours (2days) to fill prescriptions. We also encourage you to call your pharmacy before going to  your prescription to make sure it is ready. With regard to controlled substance prescription refill requests (narcotic refills) that need to be picked up at our office, we ask your cooperation by providing us with at least 72 hours (3days) notice that you will need a refill. We will not refill narcotic prescription refill requests after 4:00pm on any weekday, Monday through Thursday, or after 2:00pm on Fridays, or on the weekends. We encourage everyone to explore another way of getting your prescription refill request processed using Bee There, our patient web portal through our electronic medical record system. Bee There is an efficient and effective way to communicate your medication request directly to the office and  downloadable as an brian on your smart phone . Bee There also features a review functionality that allows you to view your medication list as well as leave messages for your physician. Are you ready to get connected?  If so please review the attatched instructions or speak to any of our staff to get you set up right away! Thank you so much for your cooperation. Should you have any questions please contact our Practice Administrator. The Physicians and Staff,  [facility]          A Healthy Lifestyle: Care Instructions  Your Care Instructions    A healthy lifestyle can help you feel good, stay at a healthy weight, and have plenty of energy for both work and play. A healthy lifestyle is something you can share with your whole family. A healthy lifestyle also can lower your risk for serious health problems, such as high blood pressure, heart disease, and diabetes. You can follow a few steps listed below to improve your health and the health of your family. Follow-up care is a key part of your treatment and safety. Be sure to make and go to all appointments, and call your doctor if you are having problems. It's also a good idea to know your test results and keep a list of the medicines you take. How can you care for yourself at home? · Do not eat too much sugar, fat, or fast foods. You can still have dessert and treats now and then. The goal is moderation. · Start small to improve your eating habits. Pay attention to portion sizes, drink less juice and soda pop, and eat more fruits and vegetables. ¨ Eat a healthy amount of food. A 3-ounce serving of meat, for example, is about the size of a deck of cards. Fill the rest of your plate with vegetables and whole grains. ¨ Limit the amount of soda and sports drinks you have every day. Drink more water when you are thirsty. ¨ Eat at least 5 servings of fruits and vegetables every day. It may seem like a lot, but it is not hard to reach this goal. A serving or helping is 1 piece of fruit, 1 cup of vegetables, or 2 cups of leafy, raw vegetables. Have an apple or some carrot sticks as an afternoon snack instead of a candy bar.  Try to have fruits and/or vegetables at every meal.  · Make exercise part of your daily routine. You may want to start with simple activities, such as walking, bicycling, or slow swimming. Try to be active 30 to 60 minutes every day. You do not need to do all 30 to 60 minutes all at once. For example, you can exercise 3 times a day for 10 or 20 minutes. Moderate exercise is safe for most people, but it is always a good idea to talk to your doctor before starting an exercise program.  · Keep moving. Grace Boys the lawn, work in the garden, or [place]. Take the stairs instead of the elevator at work. · If you smoke, quit. People who smoke have an increased risk for heart attack, stroke, cancer, and other lung illnesses. Quitting is hard, but there are ways to boost your chance of quitting tobacco for good. ¨ Use nicotine gum, patches, or lozenges. ¨ Ask your doctor about stop-smoking programs and medicines. ¨ Keep trying. In addition to reducing your risk of diseases in the future, you will notice some benefits soon after you stop using tobacco. If you have shortness of breath or asthma symptoms, they will likely get better within a few weeks after you quit. · Limit how much alcohol you drink. Moderate amounts of alcohol (up to 2 drinks a day for men, 1 drink a day for women) are okay. But drinking too much can lead to liver problems, high blood pressure, and other health problems. Family health  If you have a family, there are many things you can do together to improve your health. · Eat meals together as a family as often as possible. · Eat healthy foods. This includes fruits, vegetables, lean meats and dairy, and whole grains. · Include your family in your fitness plan. Most people think of activities such as jogging or tennis as the way to fitness, but there are many ways you and your family can be more active. Anything that makes you breathe hard and gets your heart pumping is exercise.  Here are some tips:  ¨ Walk to do errands or to take your child to school or the bus. ¨ Go for a family bike ride after dinner instead of watching TV. Where can you learn more? Go to http://fredi-josefina.info/. Enter M614 in the search box to learn more about \"A Healthy Lifestyle: Care Instructions. \"  Current as of: May 12, 2017  Content Version: 11.4  © 6197-7778 [employer]. Care instructions adapted under license by [employer] (which disclaims liability or warranty for this information). If you have questions about a medical condition or this instruction, always ask your healthcare professional. [place] [address] any warranty or liability for your use of this information.

## 2018-04-12 ENCOUNTER — OFFICE VISIT (OUTPATIENT)
Dept: NEUROLOGY | Age: 78
End: 2018-04-12

## 2018-04-12 VITALS
OXYGEN SATURATION: 98 % | HEIGHT: 65 IN | HEART RATE: 76 BPM | DIASTOLIC BLOOD PRESSURE: 70 MMHG | BODY MASS INDEX: 25.66 KG/M2 | SYSTOLIC BLOOD PRESSURE: 130 MMHG | WEIGHT: 154 LBS

## 2018-04-12 DIAGNOSIS — Z86.73 HISTORY OF STROKE: ICD-10-CM

## 2018-04-12 DIAGNOSIS — R26.9 GAIT DISORDER: ICD-10-CM

## 2018-04-12 DIAGNOSIS — G40.309 NONINTRACTABLE GENERALIZED IDIOPATHIC EPILEPSY WITHOUT STATUS EPILEPTICUS (HCC): Primary | ICD-10-CM

## 2018-04-12 RX ORDER — DIVALPROEX SODIUM 500 MG/1
500 TABLET, DELAYED RELEASE ORAL 2 TIMES DAILY
Qty: 60 TAB | Refills: 11 | Status: SHIPPED | OUTPATIENT
Start: 2018-04-12

## 2018-04-12 RX ORDER — LEVETIRACETAM 1000 MG/1
TABLET ORAL
Qty: 60 TAB | Refills: 11 | Status: SHIPPED | OUTPATIENT
Start: 2018-04-12

## 2018-04-12 NOTE — MR AVS SNAPSHOT
[address], 
[id number], [address] [place] [phone] Patient: Anthony Garay MRN: [id number] Visit Information Date & Time Provider Department Dept. Phone Encounter #  
 4/12/2018  3:40 PM Jasrpeet Asencio MD [facility]rology Clinic at [facility] [phone] [id number] Your Appointments 4/12/2019  1:40 PM  
Follow Up with Jaspreet Asencio MD  
Neurology Clinic at [facility] [address] [facility]) Appt Note: Follow up $0CP tdb 4/12/18  
 [address] [facility], 
[address], [address] [phone]  
[address] [facility], [address], [address] [address] Upcoming Health Maintenance Date Due DTaP/Tdap/Td series (1 - Tdap) 2/22/1959 ZOSTER VACCINE AGE 60> 12/22/1997 GLAUCOMA SCREENING Q2Y 2/22/2003 Bone Densitometry (Dexa) Screening 2/22/2003 Pneumococcal 65+ Low/Medium Risk (1 of 2 - PCV13) 2/22/2003 Influenza Age 5 to Adult 8/1/2017 MEDICARE YEARLY EXAM 3/14/2018 Allergies as of 4/12/2018  Review Complete On: 4/12/2018 By: Gregg Das LPN Severity Noted Reaction Type Reactions Latex  11/19/2017    Hives Amoxicillin  11/19/2017    Hives Penicillins  06/16/2011    Other (comments) Mouth sores Prednisone  11/10/2014    Other (comments) Sulfa (Sulfonamide Antibiotics)  06/16/2011    Other (comments) Stomach upset Current Immunizations  Never Reviewed No immunizations on file. Not reviewed this visit Vitals BP Pulse Height(growth percentile) Weight(growth percentile) SpO2 BMI  
 130/70 76 5' 5\" (1.651 m) 154 lb (69.9 kg) 98% 25.63 kg/m2 Smoking Status Never Smoker Vitals History BMI and BSA Data Body Mass Index Body Surface Area  
 25.63 kg/m 2 1.79 m 2 Preferred Pharmacy Pharmacy Name Phone [facility]. [phone] Your Updated Medication List  
  
   
This list is accurate as of 4/12/18  4:00 PM.  Always use your most recent med list.  
  
  
  
  
 aspirin delayed-release 81 mg tablet Take 81 mg by mouth daily. carbidopa-levodopa  mg per tablet Commonly known as:  SINEMET Take 1 Tab by mouth three (3) times daily. divalproex  mg ER tablet Commonly known as:  DEPAKOTE ER Take 500 mg by mouth every twelve (12) hours. Iron 325 mg (65 mg iron) tablet Generic drug:  ferrous sulfate Take  by mouth Daily (before breakfast). levETIRAcetam 1,000 mg tablet Take 1,000 mg by mouth two (2) times a day. levoFLOXacin 250 mg tablet Commonly known as:  Alana Lieu Take 1 Tab by mouth every twenty-four (24) hours. loratadine 10 mg tablet Commonly known as:  Bula Holms Take 10 mg by mouth daily. metoprolol tartrate 25 mg tablet Commonly known as:  LOPRESSOR Take 25 mg by mouth two (2) times a day. multivitamin tablet Commonly known as:  ONE A DAY Take 1 Tab by mouth daily. OS-MOIZ 250+D PO Take  by mouth. simvastatin 10 mg tablet Commonly known as:  ZOCOR Take 10 mg by mouth nightly. triamcinolone acetonide 0.1 % topical cream  
Commonly known as:  KENALOG Introducing [facility]! [employer] introduces Truecaller patient portal. Now you can access parts of your medical record, email your doctor's office, and request medication refills online. 1. In your internet browser, go to [email or web address] [email or web address] 2. Click on the First Time User? Click Here link in the Sign In box. You will see the New Member Sign Up page. 3. Enter your Truecaller Access Code exactly as it appears below. You will not need to use this code after youve completed the sign-up process.  If you do not sign up before the expiration date, you must request a new code. · Blogic Access Code: [email or web address] Expires: 7/11/2018  3:56 PM 
 
4. Enter the last four digits of your Social Security Number (xxxx) and Date of Birth (mm/dd/yyyy) as indicated and click Submit. You will be taken to the next sign-up page. 5. Create a Blogic ID. This will be your Blogic login ID and cannot be changed, so think of one that is secure and easy to remember. 6. Create a Blogic password. You can change your password at any time. 7. Enter your Password Reset Question and Answer. This can be used at a later time if you forget your password. 8. Enter your e-mail address. You will receive e-mail notification when new information is available in [address] 9. Click Sign Up. You can now view and download portions of your medical record. 10. Click the Download Summary menu link to download a portable copy of your medical information. If you have questions, please visit the Frequently Asked Questions section of the Blogic website. Remember, Blogic is NOT to be used for urgent needs. For medical emergencies, dial 911. Now available from your iPhone and Android! Please provide this summary of care documentation to your next provider. Your primary care clinician is listed as Chantal Tejeda. If you have any questions after today's visit, please call [phone].

## 2018-04-12 NOTE — LETTER
4/15/2018 3:26 PM 
 
Patient:  Abhinav Shaw YOB: 1940 Date of Visit: 4/12/2018 Dear Rudolph Tamez MD 
[address] [facility] [address] [phone] VIA Facsimile: [phone] 
 : 
 
 
Thank you for referring Ms. Doreen Gandhi to me for evaluation/treatment. Below are the relevant portions of my assessment and plan of care. HISTORY OF PRESENT ILLNESS Abhinav Shaw is a [de-identified] y.o. female. HPI Comments: Abhinav Shaw is a 66-year-old woman who comes in today in follow-up of a history for stroke as well as symptomatic epilepsy after craniotomy for meningioma resection with mild left residual weakness. She is currently taking Keppra 1000 mg twice daily and Depakote  twice daily. She has not had a seizure for several years, the craniotomy was a number of years ago. She is also taking Sinemet 3 times a day but does not look parkinsonian at all. She does have a history of MI, hypercholesterolemia and anemia. Seizure The history is provided by the patient. This is a chronic problem. Pertinent negatives include no headaches. Characteristics do not include loss of consciousness. She reports no headaches. Review of Systems Constitutional: Negative for chills, diaphoresis, fever, malaise/fatigue and weight loss. Neurological: Positive for focal weakness and weakness. Negative for dizziness, tingling, tremors, sensory change, speech change, seizures, loss of consciousness and headaches. Psychiatric/Behavioral: Negative. Current Outpatient Prescriptions on File Prior to Visit Medication Sig Dispense Refill  triamcinolone acetonide (KENALOG) 0.1 % topical cream     
 CALCIUM CARBONATE/VITAMIN D2 (OS-MOIZ 250+D PO) Take  by mouth.  ferrous sulfate (IRON) 325 mg (65 mg iron) tablet Take  by mouth Daily (before breakfast).  carbidopa-levodopa (SINEMET)  mg per tablet Take 1 Tab by mouth three (3) times daily.  90 Tab 0  
  divalproex ER (DEPAKOTE ER) 250 mg ER tablet Take 500 mg by mouth every twelve (12) hours.  levETIRAcetam 1,000 mg tablet Take 1,000 mg by mouth two (2) times a day.  metoprolol tartrate (LOPRESSOR) 25 mg tablet Take 25 mg by mouth two (2) times a day.  simvastatin (ZOCOR) 10 mg tablet Take 10 mg by mouth nightly.  loratadine (CLARITIN) 10 mg tablet Take 10 mg by mouth daily.  aspirin delayed-release 81 mg tablet Take 81 mg by mouth daily.  multivitamin (ONE A DAY) tablet Take 1 Tab by mouth daily.  levoFLOXacin (LEVAQUIN) 250 mg tablet Take 1 Tab by mouth every twenty-four (24) hours. 7 Tab 0 No current facility-administered medications on file prior to visit. Past Medical History:  
Diagnosis Date  Anemia  High cholesterol  History of MI (myocardial infarction)   
 x 2  
 Hx of Parkinson's disease  Hx of partial seizures  Seiz[facility] ([facility] [address]  Stroke ([facility] [address] Social History Substance Use Topics  Smoking status: Never Smoker  Smokeless tobacco: Never Used  Alcohol use No  
 
/70  Pulse 76  Ht 5' 5\" (1.651 m)  Wt 154 lb (69.9 kg)  SpO2 98%  BMI 25.63 kg/m2 Physical Exam  
Constitutional: She is oriented to person, place, and time. She appears well-developed and well-nourished. No distress. HENT:  
Head: Normocephalic and atraumatic. Mouth/Throat: Oropharynx is clear and moist. No oropharyngeal exudate. Eyes: Conjunctivae and EOM are normal. Pupils are equal, round, and reactive to light. No scleral icterus. Neck: Normal range of motion. Neck supple. No thyromegaly present. Musculoskeletal: Normal range of motion. She exhibits no edema, tenderness or deformity. Lymphadenopathy:  
  She has no cervical adenopathy. Neurological: She is alert and oriented to person, place, and time.   
Deep tendon reflexes are slightly brisker on the left, she has  left proximal lower and upper extremity weakness. Extremity weakness. Skin: Skin is warm and dry. No rash noted. She is not diaphoretic. No erythema. Psychiatric: She has a normal mood and affect. Her behavior is normal. Judgment and thought content normal.  
 
 
ASSESSMENT and PLAN 
EPILEPSY Epilepsy is well controlled on Keppra 1000 mg twice daily and Depakote  twice daily. I refilled these medications today with 11 refills. PARKINSONISM According to Dr. Chiang Patches note in the past she looked quite parkinsonian at one point. I will just simply maintain her current Sinemet any 5 101 p.o. 3 times daily. HYPERCHOLESTEROLEMIA Controlled by primary care. This note will not be viewable in [address] Level acceptable, no change in dose If you have questions, please do not hesitate to call me. I look forward to following Ms. Gandhi along with you. Sincerely, Jackeline Carrasco MD

## 2018-04-15 LAB
LEVETIRACETAM SERPL-MCNC: 44.4 UG/ML (ref 10–40)
VALPROATE SERPL-MCNC: 86 UG/ML (ref 50–100)

## 2018-04-15 NOTE — PROGRESS NOTES
HISTORY OF PRESENT ILLNESS  Angela Ryder is a [de-identified] y.o. female. HPI Comments: Angela Ryder is a 66-year-old woman who comes in today in follow-up of a history for stroke as well as symptomatic epilepsy after craniotomy for meningioma resection with mild left residual weakness. She is currently taking Keppra 1000 mg twice daily and Depakote  twice daily. She has not had a seizure for several years, the craniotomy was a number of years ago. She is also taking Sinemet 3 times a day but does not look parkinsonian at all. She does have a history of MI, hypercholesterolemia and anemia. Seizure    The history is provided by the patient. This is a chronic problem. Pertinent negatives include no headaches. Characteristics do not include loss of consciousness. She reports no headaches. Review of Systems   Constitutional: Negative for chills, diaphoresis, fever, malaise/fatigue and weight loss. Neurological: Positive for focal weakness and weakness. Negative for dizziness, tingling, tremors, sensory change, speech change, seizures, loss of consciousness and headaches. Psychiatric/Behavioral: Negative. Current Outpatient Prescriptions on File Prior to Visit   Medication Sig Dispense Refill    triamcinolone acetonide (KENALOG) 0.1 % topical cream       CALCIUM CARBONATE/VITAMIN D2 (OS-MOIZ 250+D PO) Take  by mouth.  ferrous sulfate (IRON) 325 mg (65 mg iron) tablet Take  by mouth Daily (before breakfast).  carbidopa-levodopa (SINEMET)  mg per tablet Take 1 Tab by mouth three (3) times daily. 90 Tab 0    divalproex ER (DEPAKOTE ER) 250 mg ER tablet Take 500 mg by mouth every twelve (12) hours.  levETIRAcetam 1,000 mg tablet Take 1,000 mg by mouth two (2) times a day.  metoprolol tartrate (LOPRESSOR) 25 mg tablet Take 25 mg by mouth two (2) times a day.  simvastatin (ZOCOR) 10 mg tablet Take 10 mg by mouth nightly.       loratadine (CLARITIN) 10 mg tablet Take 10 mg by mouth daily.  aspirin delayed-release 81 mg tablet Take 81 mg by mouth daily.  multivitamin (ONE A DAY) tablet Take 1 Tab by mouth daily.  levoFLOXacin (LEVAQUIN) 250 mg tablet Take 1 Tab by mouth every twenty-four (24) hours. 7 Tab 0     No current facility-administered medications on file prior to visit. Past Medical History:   Diagnosis Date    Anemia     High cholesterol     History of MI (myocardial infarction)     x 2    Hx of Parkinson's disease     Hx of partial seizures     Seizures (HCC)     Stroke ([facility] [address]      Social History   Substance Use Topics    Smoking status: Never Smoker    Smokeless tobacco: Never Used    Alcohol use No     /70  Pulse 76  Ht 5' 5\" (1.651 m)  Wt 154 lb (69.9 kg)  SpO2 98%  BMI 25.63 kg/m2    Physical Exam   Constitutional: She is oriented to person, place, and time. She appears well-developed and well-nourished. No distress. HENT:   Head: Normocephalic and atraumatic. Mouth/Throat: Oropharynx is clear and moist. No oropharyngeal exudate. Eyes: Conjunctivae and EOM are normal. Pupils are equal, round, and reactive to light. No scleral icterus. Neck: Normal range of motion. Neck supple. No thyromegaly present. Musculoskeletal: Normal range of motion. She exhibits no edema, tenderness or deformity. Lymphadenopathy:     She has no cervical adenopathy. Neurological: She is alert and oriented to person, place, and time. Deep tendon reflexes are slightly brisker on the left, she has  left proximal lower and upper extremity weakness. Extremity weakness. Skin: Skin is warm and dry. No rash noted. She is not diaphoretic. No erythema. Psychiatric: She has a normal mood and affect. Her behavior is normal. Judgment and thought content normal.       ASSESSMENT and PLAN  EPILEPSY  Epilepsy is well controlled on Keppra 1000 mg twice daily and Depakote  twice daily. I refilled these medications today with 11 refills. PARKINSONISM  According to Dr. Chiang Patches note in the past she looked quite parkinsonian at one point. I will just simply maintain her current Sinemet any 5 101 p.o. 3 times daily. HYPERCHOLESTEROLEMIA  Controlled by primary care. This note will not be viewable in [address]

## 2019-01-04 ENCOUNTER — HOSPITAL ENCOUNTER (OUTPATIENT)
Dept: MAMMOGRAPHY | Age: 79
Discharge: HOME | End: 2019-01-04
Attending: FAMILY MEDICINE
Payer: MEDICARE

## 2019-01-04 DIAGNOSIS — M81.0 OSTEOPOROSIS: ICD-10-CM

## 2019-01-04 DIAGNOSIS — Z12.31 VISIT FOR SCREENING MAMMOGRAM: ICD-10-CM

## 2019-01-04 PROCEDURE — 77080 DXA BONE DENSITY AXIAL: CPT

## 2019-01-04 PROCEDURE — 77067 SCR MAMMO BI INCL CAD: CPT

## 2022-03-18 PROBLEM — G40.109 PARTIAL SYMPTOMATIC EPILEPSY (HCC): Status: ACTIVE | Noted: 2017-11-20

## 2022-03-19 PROBLEM — R29.898 WEAKNESS OF BOTH LEGS: Status: ACTIVE | Noted: 2017-11-19

## 2023-08-22 ENCOUNTER — TRANSCRIBE ORDERS (OUTPATIENT)
Dept: SCHEDULING | Age: 83
End: 2023-08-22

## 2023-08-22 ENCOUNTER — TRANSCRIBE ORDERS (OUTPATIENT)
Facility: HOSPITAL | Age: 83
End: 2023-08-22

## 2023-08-22 DIAGNOSIS — R39.15 URGENCY OF URINATION: Primary | ICD-10-CM

## 2023-08-22 DIAGNOSIS — R32 URINARY INCONTINENCE, UNSPECIFIED TYPE: ICD-10-CM

## 2023-08-22 DIAGNOSIS — N39.43 POST-VOID DRIBBLING: ICD-10-CM

## 2023-08-22 DIAGNOSIS — N39.3 FEMALE STRESS INCONTINENCE: ICD-10-CM

## 2023-08-22 DIAGNOSIS — R39.81 FUNCTIONAL URINARY INCONTINENCE: Primary | ICD-10-CM

## 2023-08-23 ENCOUNTER — TRANSCRIBE ORDERS (OUTPATIENT)
Facility: HOSPITAL | Age: 83
End: 2023-08-23

## 2023-08-23 DIAGNOSIS — R33.9 BLADDER RETENTION: Primary | ICD-10-CM

## 2024-08-30 ENCOUNTER — APPOINTMENT (OUTPATIENT)
Facility: HOSPITAL | Age: 84
End: 2024-08-30
Payer: MEDICARE

## 2024-08-30 ENCOUNTER — HOSPITAL ENCOUNTER (EMERGENCY)
Facility: HOSPITAL | Age: 84
Discharge: HOME | End: 2024-08-30
Attending: EMERGENCY MEDICINE
Payer: MEDICARE

## 2024-08-30 VITALS
RESPIRATION RATE: 12 BRPM | TEMPERATURE: 97.7 F | HEART RATE: 76 BPM | SYSTOLIC BLOOD PRESSURE: 144 MMHG | DIASTOLIC BLOOD PRESSURE: 89 MMHG | OXYGEN SATURATION: 98 %

## 2024-08-30 DIAGNOSIS — G40.909 SEIZURE DISORDER (HCC): Primary | ICD-10-CM

## 2024-08-30 LAB
ALBUMIN SERPL-MCNC: 2.7 G/DL (ref 3.5–5)
ALBUMIN/GLOB SERPL: 0.6 (ref 1.1–2.2)
ALP SERPL-CCNC: 112 U/L (ref 45–117)
ALT SERPL-CCNC: 14 U/L (ref 12–78)
ANION GAP SERPL CALC-SCNC: 4 MMOL/L (ref 5–15)
APPEARANCE UR: CLEAR
AST SERPL-CCNC: 24 U/L (ref 15–37)
BACTERIA URNS QL MICRO: NEGATIVE /HPF
BASOPHILS # BLD: 0 K/UL (ref 0–0.1)
BASOPHILS NFR BLD: 0 % (ref 0–1)
BILIRUB SERPL-MCNC: 0.2 MG/DL (ref 0.2–1)
BILIRUB UR QL: NEGATIVE
BUN SERPL-MCNC: 18 MG/DL (ref 6–20)
BUN/CREAT SERPL: 25 (ref 12–20)
CALCIUM SERPL-MCNC: 10.5 MG/DL (ref 8.5–10.1)
CHLORIDE SERPL-SCNC: 114 MMOL/L (ref 97–108)
CO2 SERPL-SCNC: 27 MMOL/L (ref 21–32)
COLOR UR: ABNORMAL
COMMENT:: NORMAL
CREAT SERPL-MCNC: 0.72 MG/DL (ref 0.55–1.02)
DIFFERENTIAL METHOD BLD: ABNORMAL
EOSINOPHIL # BLD: 0 K/UL (ref 0–0.4)
EOSINOPHIL NFR BLD: 1 % (ref 0–7)
EPITH CASTS URNS QL MICRO: ABNORMAL /LPF
ERYTHROCYTE [DISTWIDTH] IN BLOOD BY AUTOMATED COUNT: 14.9 % (ref 11.5–14.5)
GLOBULIN SER CALC-MCNC: 4.6 G/DL (ref 2–4)
GLUCOSE SERPL-MCNC: 152 MG/DL (ref 65–100)
GLUCOSE UR STRIP.AUTO-MCNC: NEGATIVE MG/DL
HCT VFR BLD AUTO: 39.5 % (ref 35–47)
HGB BLD-MCNC: 12.5 G/DL (ref 11.5–16)
HGB UR QL STRIP: ABNORMAL
IMM GRANULOCYTES # BLD AUTO: 0 K/UL (ref 0–0.04)
IMM GRANULOCYTES NFR BLD AUTO: 0 % (ref 0–0.5)
KETONES UR QL STRIP.AUTO: ABNORMAL MG/DL
LEUKOCYTE ESTERASE UR QL STRIP.AUTO: NEGATIVE
LYMPHOCYTES # BLD: 2 K/UL (ref 0.8–3.5)
LYMPHOCYTES NFR BLD: 27 % (ref 12–49)
MCH RBC QN AUTO: 33.2 PG (ref 26–34)
MCHC RBC AUTO-ENTMCNC: 31.6 G/DL (ref 30–36.5)
MCV RBC AUTO: 105.1 FL (ref 80–99)
MONOCYTES # BLD: 0.6 K/UL (ref 0–1)
MONOCYTES NFR BLD: 8 % (ref 5–13)
MUCOUS THREADS URNS QL MICRO: ABNORMAL /LPF
NEUTS SEG # BLD: 4.7 K/UL (ref 1.8–8)
NEUTS SEG NFR BLD: 64 % (ref 32–75)
NITRITE UR QL STRIP.AUTO: NEGATIVE
NRBC # BLD: 0 K/UL (ref 0–0.01)
NRBC BLD-RTO: 0 PER 100 WBC
PH UR STRIP: 5.5 (ref 5–8)
PLATELET # BLD AUTO: 200 K/UL (ref 150–400)
PMV BLD AUTO: 10.6 FL (ref 8.9–12.9)
POTASSIUM SERPL-SCNC: 4.1 MMOL/L (ref 3.5–5.1)
PROT SERPL-MCNC: 7.3 G/DL (ref 6.4–8.2)
PROT UR STRIP-MCNC: NEGATIVE MG/DL
RBC # BLD AUTO: 3.76 M/UL (ref 3.8–5.2)
RBC #/AREA URNS HPF: ABNORMAL /HPF (ref 0–5)
SODIUM SERPL-SCNC: 145 MMOL/L (ref 136–145)
SP GR UR REFRACTOMETRY: 1.03 (ref 1–1.03)
SPECIMEN HOLD: NORMAL
TROPONIN I SERPL HS-MCNC: 15 NG/L (ref 0–51)
UROBILINOGEN UR QL STRIP.AUTO: 1 EU/DL (ref 0.2–1)
VALPROATE SERPL-MCNC: 120 UG/ML (ref 50–100)
WBC # BLD AUTO: 7.3 K/UL (ref 3.6–11)
WBC URNS QL MICRO: ABNORMAL /HPF (ref 0–4)

## 2024-08-30 PROCEDURE — 70450 CT HEAD/BRAIN W/O DYE: CPT

## 2024-08-30 PROCEDURE — 95706 EEG WO VID 2-12HR INTMT MNTR: CPT

## 2024-08-30 PROCEDURE — 80177 DRUG SCRN QUAN LEVETIRACETAM: CPT

## 2024-08-30 PROCEDURE — 80053 COMPREHEN METABOLIC PANEL: CPT

## 2024-08-30 PROCEDURE — 80164 ASSAY DIPROPYLACETIC ACD TOT: CPT

## 2024-08-30 PROCEDURE — 85025 COMPLETE CBC W/AUTO DIFF WBC: CPT

## 2024-08-30 PROCEDURE — 99284 EMERGENCY DEPT VISIT MOD MDM: CPT

## 2024-08-30 PROCEDURE — 36415 COLL VENOUS BLD VENIPUNCTURE: CPT

## 2024-08-30 PROCEDURE — 81001 URINALYSIS AUTO W/SCOPE: CPT

## 2024-08-30 PROCEDURE — 84484 ASSAY OF TROPONIN QUANT: CPT

## 2024-08-30 PROCEDURE — 80235 DRUG ASSAY LACOSAMIDE: CPT

## 2024-08-30 PROCEDURE — 95717 EEG PHYS/QHP 2-12 HR W/O VID: CPT | Performed by: PSYCHIATRY & NEUROLOGY

## 2024-08-30 ASSESSMENT — LIFESTYLE VARIABLES
HOW MANY STANDARD DRINKS CONTAINING ALCOHOL DO YOU HAVE ON A TYPICAL DAY: PATIENT DOES NOT DRINK
HOW OFTEN DO YOU HAVE A DRINK CONTAINING ALCOHOL: NEVER

## 2024-08-30 NOTE — ED TRIAGE NOTES
Pt arrives to ED via EMS with complaints of AMS with 12 minutes seizure, 3 minute pause and 5 minute seizure again per staff at [facility].  Pt has hx of prior stroke leaving her with cognitive dysfunction, dysphasia, right side weakness.  Pt has hx of epilepsy.  Baseline normally alert x2.  Pt appears lethargic with little response.

## 2024-08-30 NOTE — ED NOTES
TRANSFER - OUT REPORT:  Verbal report given to RN on Kerrie Branch  being transferred to [facility] for return to Assisted Living from ED     Report consisted of patient's Situation, Background, Assessment and Recommendations(SBAR).   Information from the following report(s) Nurse Handoff Report and ED Encounter Summary was reviewed with the receiving nurse.  Opportunity for questions and clarification was provided.      Patient transported with:  [facility]

## 2024-08-30 NOTE — ED PROVIDER NOTES
[facility] EMERGENCY DEPT  EMERGENCY DEPARTMENT ENCOUNTER      Pt Name: Kerrie Godwin  MRN: [id number]  Birthdate 1940  Date of evaluation: 8/30/2024  Provider: Mirna Ruby MD    CHIEF COMPLAINT     No chief complaint on file.        HISTORY OF PRESENT ILLNESS    Kerrie Godwin is an 85 yo F with h/o stroke and seizure.  She is brought to the ED by EMS from a nursing facility where she was noted to be unresponsive.  EMS reports that when she arrived she appeared to be experiencing a non convulsive seizure with eyes deviated to the right and then she became responsive shortly after.  She has been sleepy but responsive with them.  They note h/o slurred speech due to prior stroke.            Additional history from independent historians:     Review of External Medical Records:     Nursing Notes were reviewed.    REVIEW OF SYSTEMS       Review of Systems    Except as noted above the remainder of the review of systems was reviewed and negative.       PAST MEDICAL HISTORY   No past medical history on file.      SURGICAL HISTORY     No past surgical history on file.      CURRENT MEDICATIONS       Previous Medications    No medications on file       ALLERGIES     Patient has no allergy information on record.    FAMILY HISTORY     No family history on file.       SOCIAL HISTORY       Social History     Socioeconomic History    Marital status: Unknown     Social Determinants of Health     Food Insecurity: No Food Insecurity (8/4/2024)    Received from [facility]    Hunger Vital Sign     Worried About Running Out of Food in the Last Year: Never true     Ran Out of Food in the Last Year: Never true   Transportation Needs: No Transportation Needs (8/4/2024)    Received from [facility]    PRAPARE - Transportation     Lack of Transportation (Medical): No     Lack of Transportation (Non-Medical): No   Housing Stability: Low Risk  (8/4/2024)    Received from [facility]    Housing Stability Vital Sign     Unable to Pay for

## 2024-08-31 NOTE — PROCEDURES
PROCEDURE NOTE  Date: 8/30/2024   Name: Kerrie Godwin  YOB: 1940    Procedures      [facility]     Electroencephalogram Report    Procedure ID:  Procedure Date: 08/30/2024   Patient Name: Kerrie Godwin YOB: 1940   Procedure Type: Rapid EEG Medical Record No: [id number]     INDICATION: Seizure    STATE: Awake and drowsy .    Description of procedure: This EEG was obtained using a 10 lead, 8 channel system positioned circumferentially without any parasagittal coverage (rapid EEG). Computer selected EEG is reviewed as well as background features and all clinically significant events. Clarity algorithm utilized and implemented to provide analysis of underlying activity and seizure detection used to facilitate reading.    A total of 03:36:45 hours of recording were done.    Description of recording: During maximal wakefulness, revealed normal awake background activity that slows with the onset of drowsiness. No epileptiform discharges, focal slowing, or electrographic seizures were noted throughout the recording.    Impression: Normal EEG during the awake and drowsy states.    Comment: A normal EEG does not rule out the diagnosis of a seizure disorder; clinical  correlation is advised.  If there is still persistent suspicion for continued seizure-like  activity, would advise obtaining an electroencephalogram with the 10-20 international  system for improved spatial resolution and parasagittal coverage.       Medications:  No current facility-administered medications for this encounter.     No current outpatient medications on file.

## 2024-09-02 LAB — LEVETIRACETAM SERPL-MCNC: 50.8 UG/ML (ref 10–40)

## 2024-09-03 LAB — LACOSAMIDE SERPL-MCNC: 27.3 UG/ML (ref 5–10)

## 2025-01-07 NOTE — PHYSICIAN ADVISORY
Letter of Status Determination:    Recommend Changing patient status from   INPATIENT to OBSERVATION      Pt Name:  Harshad Contreras   MR#  [id number]   [facility]#   [id number]   [address] [place] [address]  207/02  @ . [facility] hospital   Hospitalization date  11/19/2017  3:41 PM   Current Attending Physician  Willy diagnosis  <principal problem not specified>   Seizure ([address] Seizure ([facility] [address] Weakness due to cerebrovascular accident   Clinicals  76 y.o. y.o  female hospitalized with above diagnosis. The workup is negative so far for acute CVA including neurologist consultation, MRI CNS etc.        [place] MCG criteria   Does  apply Acute CVA. Seizure   Weakness    STATUS DETERMINATION  On the basis of review of available clinical data, documentation in the chart  It is our recommendation that the patient's status is changed from INPATIENT to OBSERVATION         The final decision of the patient's hospitalization status depends on the attending physician's judgment      Additional comments  The patient's chosen insurance company dictates utilization decision on the basis of their preferred guidelines. This particular company chooses to follow MCG. We need to prove that there is strong evidence supporting inpatient status per MCG or provide clear documentation of proven medical necessity of care that can only be done at inpatient level. Also, this particular insurance company does NOT allow downgrading to OBSERVATION status once pt is discharged. Thus it is imperative we make a final decision of the status of the patient's care prior to discharge; otherwise this whole episode of care miy be denied!      Insurance  Payor: [employer] / Plan: [address] HMO / Product Type: Managed Care Medicare /             Alexander Go MD MPH FACP     Physician Vaish[facility] [id number] [facility] [place]   President Medical Staff, [address] [place] [address]    Cell  [phone]            Insurance Information                [address] Phone:     Subscriber: Cielo Guevara Subscriber#: [id number]    Group#: [id number] Precert#: Her/She